# Patient Record
Sex: MALE | Race: WHITE | Employment: FULL TIME | URBAN - METROPOLITAN AREA
[De-identification: names, ages, dates, MRNs, and addresses within clinical notes are randomized per-mention and may not be internally consistent; named-entity substitution may affect disease eponyms.]

---

## 2017-02-14 ENCOUNTER — TELEPHONE (OUTPATIENT)
Dept: FAMILY MEDICINE CLINIC | Age: 22
End: 2017-02-14

## 2017-02-15 ENCOUNTER — TELEPHONE (OUTPATIENT)
Dept: FAMILY MEDICINE CLINIC | Age: 22
End: 2017-02-15

## 2017-04-27 ENCOUNTER — TELEPHONE (OUTPATIENT)
Dept: FAMILY MEDICINE CLINIC | Age: 22
End: 2017-04-27

## 2017-05-13 ENCOUNTER — OFFICE VISIT (OUTPATIENT)
Dept: FAMILY MEDICINE CLINIC | Age: 22
End: 2017-05-13

## 2017-05-13 VITALS
DIASTOLIC BLOOD PRESSURE: 62 MMHG | OXYGEN SATURATION: 98 % | WEIGHT: 194.2 LBS | HEIGHT: 72 IN | SYSTOLIC BLOOD PRESSURE: 116 MMHG | HEART RATE: 54 BPM | BODY MASS INDEX: 26.3 KG/M2

## 2017-05-13 DIAGNOSIS — L70.0 ACNE VULGARIS: Primary | ICD-10-CM

## 2017-05-13 PROCEDURE — 99213 OFFICE O/P EST LOW 20 MIN: CPT | Performed by: FAMILY MEDICINE

## 2017-05-13 RX ORDER — CLINDAMYCIN PHOSPHATE 10 UG/ML
LOTION TOPICAL
Qty: 60 G | Refills: 2 | Status: SHIPPED | OUTPATIENT
Start: 2017-05-13 | End: 2018-08-03

## 2017-05-13 ASSESSMENT — ENCOUNTER SYMPTOMS
ABDOMINAL PAIN: 0
DIARRHEA: 0
SORE THROAT: 0
SHORTNESS OF BREATH: 0
CONSTIPATION: 0
BLOOD IN STOOL: 0

## 2017-05-19 ENCOUNTER — TELEPHONE (OUTPATIENT)
Dept: FAMILY MEDICINE CLINIC | Age: 22
End: 2017-05-19

## 2017-05-25 ENCOUNTER — OFFICE VISIT (OUTPATIENT)
Dept: FAMILY MEDICINE CLINIC | Age: 22
End: 2017-05-25

## 2017-05-25 VITALS
HEART RATE: 65 BPM | OXYGEN SATURATION: 98 % | DIASTOLIC BLOOD PRESSURE: 74 MMHG | HEIGHT: 72 IN | BODY MASS INDEX: 26.55 KG/M2 | SYSTOLIC BLOOD PRESSURE: 122 MMHG | WEIGHT: 196 LBS

## 2017-05-25 DIAGNOSIS — D22.9 ATYPICAL NEVUS: ICD-10-CM

## 2017-05-25 DIAGNOSIS — L70.0 ACNE VULGARIS: Primary | ICD-10-CM

## 2017-05-25 DIAGNOSIS — B07.9 VIRAL WARTS, UNSPECIFIED TYPE: ICD-10-CM

## 2017-05-25 PROCEDURE — 99213 OFFICE O/P EST LOW 20 MIN: CPT | Performed by: FAMILY MEDICINE

## 2017-05-25 PROCEDURE — 17110 DESTRUCTION B9 LES UP TO 14: CPT | Performed by: FAMILY MEDICINE

## 2017-05-25 ASSESSMENT — ENCOUNTER SYMPTOMS
DIARRHEA: 0
ABDOMINAL PAIN: 0
CONSTIPATION: 0
SORE THROAT: 0
SHORTNESS OF BREATH: 0
BLOOD IN STOOL: 0

## 2017-06-01 PROBLEM — D22.9 ATYPICAL NEVUS: Status: ACTIVE | Noted: 2017-05-01

## 2017-06-10 ENCOUNTER — OFFICE VISIT (OUTPATIENT)
Dept: FAMILY MEDICINE CLINIC | Age: 22
End: 2017-06-10

## 2017-06-10 VITALS
HEART RATE: 64 BPM | WEIGHT: 192 LBS | HEIGHT: 72 IN | DIASTOLIC BLOOD PRESSURE: 68 MMHG | SYSTOLIC BLOOD PRESSURE: 118 MMHG | OXYGEN SATURATION: 98 % | BODY MASS INDEX: 26.01 KG/M2

## 2017-06-10 DIAGNOSIS — B07.9 VIRAL WARTS, UNSPECIFIED TYPE: ICD-10-CM

## 2017-06-10 DIAGNOSIS — L70.0 ACNE VULGARIS: Primary | ICD-10-CM

## 2017-06-10 DIAGNOSIS — D22.9 ATYPICAL NEVUS: ICD-10-CM

## 2017-06-10 DIAGNOSIS — Z87.39 HISTORY OF RHABDOMYOLYSIS: ICD-10-CM

## 2017-06-10 LAB — TOTAL CK: 434 U/L (ref 0–190)

## 2017-06-10 PROCEDURE — 99213 OFFICE O/P EST LOW 20 MIN: CPT | Performed by: FAMILY MEDICINE

## 2017-06-10 PROCEDURE — 17110 DESTRUCTION B9 LES UP TO 14: CPT | Performed by: FAMILY MEDICINE

## 2017-06-10 ASSESSMENT — ENCOUNTER SYMPTOMS
SORE THROAT: 0
DIARRHEA: 0
SHORTNESS OF BREATH: 0
BLOOD IN STOOL: 0
CONSTIPATION: 0
ABDOMINAL PAIN: 0

## 2017-06-13 DIAGNOSIS — R74.8 ELEVATED CK: Primary | ICD-10-CM

## 2017-06-15 LAB
% CKMB: 0 % (ref 0–4)
CK-BB: 0 % (ref 0–0)
CK-MACRO TYPE I: 0 % (ref 0–0)
CK-MACRO TYPE II: 0 % (ref 0–0)
CK-MM: 100 % (ref 96–100)
TOTAL CK: 440 U/L (ref 20–200)

## 2017-06-22 ENCOUNTER — OFFICE VISIT (OUTPATIENT)
Dept: FAMILY MEDICINE CLINIC | Age: 22
End: 2017-06-22

## 2017-06-22 VITALS
WEIGHT: 194 LBS | OXYGEN SATURATION: 98 % | HEIGHT: 72 IN | BODY MASS INDEX: 26.28 KG/M2 | HEART RATE: 64 BPM | SYSTOLIC BLOOD PRESSURE: 102 MMHG | DIASTOLIC BLOOD PRESSURE: 54 MMHG

## 2017-06-22 DIAGNOSIS — B07.9 VIRAL WARTS, UNSPECIFIED TYPE: ICD-10-CM

## 2017-06-22 DIAGNOSIS — L70.0 ACNE VULGARIS: Primary | ICD-10-CM

## 2017-06-22 PROCEDURE — 99213 OFFICE O/P EST LOW 20 MIN: CPT | Performed by: FAMILY MEDICINE

## 2017-06-22 RX ORDER — AMOXICILLIN 250 MG/1
250 CAPSULE ORAL 2 TIMES DAILY
Qty: 60 CAPSULE | Refills: 2 | Status: SHIPPED | OUTPATIENT
Start: 2017-06-22 | End: 2018-08-03

## 2017-06-22 ASSESSMENT — ENCOUNTER SYMPTOMS
DIARRHEA: 0
ABDOMINAL PAIN: 0
SHORTNESS OF BREATH: 0
CONSTIPATION: 0
BLOOD IN STOOL: 0
SORE THROAT: 0

## 2017-07-08 ENCOUNTER — OFFICE VISIT (OUTPATIENT)
Dept: FAMILY MEDICINE CLINIC | Age: 22
End: 2017-07-08

## 2017-07-08 VITALS
DIASTOLIC BLOOD PRESSURE: 60 MMHG | WEIGHT: 187 LBS | HEIGHT: 72 IN | BODY MASS INDEX: 25.33 KG/M2 | SYSTOLIC BLOOD PRESSURE: 130 MMHG | HEART RATE: 73 BPM | OXYGEN SATURATION: 98 %

## 2017-07-08 DIAGNOSIS — L70.0 ACNE VULGARIS: Primary | ICD-10-CM

## 2017-07-08 PROCEDURE — 99213 OFFICE O/P EST LOW 20 MIN: CPT | Performed by: FAMILY MEDICINE

## 2017-07-08 RX ORDER — TRETINOIN 0.05 G/100G
GEL TOPICAL
Qty: 45 G | Refills: 5 | Status: SHIPPED | OUTPATIENT
Start: 2017-07-08 | End: 2018-08-03

## 2017-07-08 ASSESSMENT — ENCOUNTER SYMPTOMS
SHORTNESS OF BREATH: 0
BLOOD IN STOOL: 0
CONSTIPATION: 0
ABDOMINAL PAIN: 0
DIARRHEA: 0
SORE THROAT: 0

## 2017-07-08 ASSESSMENT — PATIENT HEALTH QUESTIONNAIRE - PHQ9
1. LITTLE INTEREST OR PLEASURE IN DOING THINGS: 0
2. FEELING DOWN, DEPRESSED OR HOPELESS: 0
SUM OF ALL RESPONSES TO PHQ9 QUESTIONS 1 & 2: 0
SUM OF ALL RESPONSES TO PHQ QUESTIONS 1-9: 0

## 2017-07-27 ENCOUNTER — OFFICE VISIT (OUTPATIENT)
Dept: FAMILY MEDICINE CLINIC | Age: 22
End: 2017-07-27

## 2017-07-27 VITALS
DIASTOLIC BLOOD PRESSURE: 68 MMHG | HEART RATE: 58 BPM | WEIGHT: 186 LBS | BODY MASS INDEX: 25.19 KG/M2 | SYSTOLIC BLOOD PRESSURE: 124 MMHG | OXYGEN SATURATION: 98 % | HEIGHT: 72 IN

## 2017-07-27 DIAGNOSIS — L70.0 ACNE VULGARIS: Primary | ICD-10-CM

## 2017-07-27 PROCEDURE — 99213 OFFICE O/P EST LOW 20 MIN: CPT | Performed by: FAMILY MEDICINE

## 2017-07-27 ASSESSMENT — ENCOUNTER SYMPTOMS
ABDOMINAL PAIN: 0
BLOOD IN STOOL: 0
DIARRHEA: 0
SORE THROAT: 0
SHORTNESS OF BREATH: 0
CONSTIPATION: 0

## 2017-08-10 ENCOUNTER — OFFICE VISIT (OUTPATIENT)
Dept: FAMILY MEDICINE CLINIC | Age: 22
End: 2017-08-10

## 2017-08-10 VITALS
BODY MASS INDEX: 24.79 KG/M2 | WEIGHT: 183 LBS | HEART RATE: 62 BPM | HEIGHT: 72 IN | SYSTOLIC BLOOD PRESSURE: 118 MMHG | DIASTOLIC BLOOD PRESSURE: 68 MMHG | OXYGEN SATURATION: 98 %

## 2017-08-10 DIAGNOSIS — L70.0 ACNE VULGARIS: Primary | ICD-10-CM

## 2017-08-10 PROCEDURE — 99213 OFFICE O/P EST LOW 20 MIN: CPT | Performed by: FAMILY MEDICINE

## 2018-03-08 ENCOUNTER — OFFICE VISIT (OUTPATIENT)
Dept: FAMILY MEDICINE CLINIC | Age: 23
End: 2018-03-08
Payer: COMMERCIAL

## 2018-03-08 VITALS
OXYGEN SATURATION: 99 % | WEIGHT: 186 LBS | BODY MASS INDEX: 25.19 KG/M2 | HEART RATE: 68 BPM | HEIGHT: 72 IN | SYSTOLIC BLOOD PRESSURE: 118 MMHG | DIASTOLIC BLOOD PRESSURE: 68 MMHG

## 2018-03-08 DIAGNOSIS — L29.9 PRURITUS OF SKIN: ICD-10-CM

## 2018-03-08 DIAGNOSIS — B07.9 VIRAL WARTS, UNSPECIFIED TYPE: Primary | ICD-10-CM

## 2018-03-08 PROCEDURE — 17110 DESTRUCTION B9 LES UP TO 14: CPT | Performed by: FAMILY MEDICINE

## 2018-03-08 ASSESSMENT — ENCOUNTER SYMPTOMS
ABDOMINAL PAIN: 0
SHORTNESS OF BREATH: 0

## 2018-08-03 ENCOUNTER — OFFICE VISIT (OUTPATIENT)
Dept: PRIMARY CARE CLINIC | Age: 23
End: 2018-08-03
Payer: COMMERCIAL

## 2018-08-03 ENCOUNTER — HOSPITAL ENCOUNTER (EMERGENCY)
Age: 23
Discharge: HOME | End: 2018-08-03
Attending: EMERGENCY MEDICINE
Payer: COMMERCIAL

## 2018-08-03 VITALS
SYSTOLIC BLOOD PRESSURE: 122 MMHG | DIASTOLIC BLOOD PRESSURE: 78 MMHG | TEMPERATURE: 98.4 F | OXYGEN SATURATION: 98 % | HEART RATE: 65 BPM | WEIGHT: 170 LBS | BODY MASS INDEX: 23.03 KG/M2 | RESPIRATION RATE: 15 BRPM | HEIGHT: 72 IN

## 2018-08-03 VITALS
DIASTOLIC BLOOD PRESSURE: 88 MMHG | RESPIRATION RATE: 16 BRPM | SYSTOLIC BLOOD PRESSURE: 128 MMHG | TEMPERATURE: 98.1 F | WEIGHT: 171 LBS | OXYGEN SATURATION: 98 % | HEIGHT: 72 IN | HEART RATE: 79 BPM | BODY MASS INDEX: 23.16 KG/M2

## 2018-08-03 DIAGNOSIS — R81 GLUCOSURIA: ICD-10-CM

## 2018-08-03 DIAGNOSIS — R63.1 ALWAYS THIRSTY: ICD-10-CM

## 2018-08-03 DIAGNOSIS — R35.0 FREQUENT URINATION: Primary | ICD-10-CM

## 2018-08-03 DIAGNOSIS — R73.09 ELEVATED HEMOGLOBIN A1C: ICD-10-CM

## 2018-08-03 DIAGNOSIS — E11.9 DIABETES MELLITUS, NEW ONSET (HCC): Primary | ICD-10-CM

## 2018-08-03 LAB
ALBUMIN SERPL-MCNC: 4.8 G/DL (ref 3.9–4.9)
ALP BLD-CCNC: 122 U/L (ref 35–104)
ALT SERPL-CCNC: 44 U/L (ref 0–41)
ANION GAP SERPL CALCULATED.3IONS-SCNC: 15 MEQ/L (ref 7–13)
AST SERPL-CCNC: 29 U/L (ref 0–40)
BETA-HYDROXYBUTYRATE: 1.9 MG/DL (ref 0.2–2.8)
BILIRUB SERPL-MCNC: 0.6 MG/DL (ref 0–1.2)
BILIRUBIN URINE: NEGATIVE
BILIRUBIN, POC: NORMAL
BLOOD URINE, POC: NORMAL
BLOOD, URINE: NEGATIVE
BUN BLDV-MCNC: 21 MG/DL (ref 6–20)
CALCIUM SERPL-MCNC: 9 MG/DL (ref 8.6–10.2)
CHLORIDE BLD-SCNC: 93 MEQ/L (ref 98–107)
CHP ED QC CHECK: NORMAL
CLARITY, POC: CLEAR
CLARITY: CLEAR
CO2: 23 MEQ/L (ref 22–29)
COLOR, POC: YELLOW
COLOR: YELLOW
CREAT SERPL-MCNC: 0.97 MG/DL (ref 0.7–1.2)
GFR AFRICAN AMERICAN: >60
GFR NON-AFRICAN AMERICAN: >60
GLOBULIN: 2.5 G/DL (ref 2.3–3.5)
GLUCOSE BLD-MCNC: 352 MG/DL (ref 60–115)
GLUCOSE BLD-MCNC: 357 MG/DL
GLUCOSE BLD-MCNC: 357 MG/DL (ref 60–115)
GLUCOSE BLD-MCNC: 493 MG/DL (ref 74–109)
GLUCOSE BLD-MCNC: 508 MG/DL (ref 60–115)
GLUCOSE BLD-MCNC: NORMAL MG/DL
GLUCOSE URINE, POC: NORMAL
GLUCOSE URINE: >=1000 MG/DL
HBA1C MFR BLD: 11 %
HCT VFR BLD CALC: 44.9 % (ref 42–52)
HEMOGLOBIN: 15.7 G/DL (ref 14–18)
KETONES, POC: NORMAL
KETONES, URINE: 15 MG/DL
LEUKOCYTE EST, POC: NORMAL
LEUKOCYTE ESTERASE, URINE: NEGATIVE
MCH RBC QN AUTO: 32.5 PG (ref 27–31.3)
MCHC RBC AUTO-ENTMCNC: 35 % (ref 33–37)
MCV RBC AUTO: 92.9 FL (ref 80–100)
NITRITE, POC: NORMAL
NITRITE, URINE: NEGATIVE
PDW BLD-RTO: 13.3 % (ref 11.5–14.5)
PERFORMED ON: ABNORMAL
PH UA: 5.5 (ref 5–9)
PH, POC: 6
PLATELET # BLD: 200 K/UL (ref 130–400)
POTASSIUM SERPL-SCNC: 3.9 MEQ/L (ref 3.5–5.1)
PROTEIN UA: NEGATIVE MG/DL
PROTEIN, POC: NORMAL
RBC # BLD: 4.84 M/UL (ref 4.7–6.1)
SODIUM BLD-SCNC: 131 MEQ/L (ref 132–144)
SPECIFIC GRAVITY UA: 1.03 (ref 1–1.03)
SPECIFIC GRAVITY, POC: 1.01
TOTAL PROTEIN: 7.3 G/DL (ref 6.4–8.1)
URINE REFLEX TO CULTURE: ABNORMAL
UROBILINOGEN, POC: NORMAL
UROBILINOGEN, URINE: 0.2 E.U./DL
WBC # BLD: 7.6 K/UL (ref 4.5–11)

## 2018-08-03 PROCEDURE — 6370000000 HC RX 637 (ALT 250 FOR IP): Performed by: EMERGENCY MEDICINE

## 2018-08-03 PROCEDURE — 82962 GLUCOSE BLOOD TEST: CPT | Performed by: PHYSICIAN ASSISTANT

## 2018-08-03 PROCEDURE — 99283 EMERGENCY DEPT VISIT LOW MDM: CPT

## 2018-08-03 PROCEDURE — 80053 COMPREHEN METABOLIC PANEL: CPT

## 2018-08-03 PROCEDURE — 82010 KETONE BODYS QUAN: CPT

## 2018-08-03 PROCEDURE — 81002 URINALYSIS NONAUTO W/O SCOPE: CPT | Performed by: PHYSICIAN ASSISTANT

## 2018-08-03 PROCEDURE — 96372 THER/PROPH/DIAG INJ SC/IM: CPT

## 2018-08-03 PROCEDURE — 83036 HEMOGLOBIN GLYCOSYLATED A1C: CPT | Performed by: PHYSICIAN ASSISTANT

## 2018-08-03 PROCEDURE — 2580000003 HC RX 258: Performed by: EMERGENCY MEDICINE

## 2018-08-03 PROCEDURE — 99213 OFFICE O/P EST LOW 20 MIN: CPT | Performed by: PHYSICIAN ASSISTANT

## 2018-08-03 PROCEDURE — 85027 COMPLETE CBC AUTOMATED: CPT

## 2018-08-03 PROCEDURE — 81003 URINALYSIS AUTO W/O SCOPE: CPT

## 2018-08-03 PROCEDURE — 36415 COLL VENOUS BLD VENIPUNCTURE: CPT

## 2018-08-03 RX ORDER — INSULIN GLARGINE 100 [IU]/ML
10 INJECTION, SOLUTION SUBCUTANEOUS ONCE
Status: COMPLETED | OUTPATIENT
Start: 2018-08-03 | End: 2018-08-03

## 2018-08-03 RX ORDER — 0.9 % SODIUM CHLORIDE 0.9 %
2000 INTRAVENOUS SOLUTION INTRAVENOUS ONCE
Status: COMPLETED | OUTPATIENT
Start: 2018-08-03 | End: 2018-08-03

## 2018-08-03 RX ADMIN — SODIUM CHLORIDE 1000 ML: 9 INJECTION, SOLUTION INTRAVENOUS at 18:36

## 2018-08-03 RX ADMIN — INSULIN HUMAN 5 UNITS: 100 INJECTION, SOLUTION PARENTERAL at 20:26

## 2018-08-03 RX ADMIN — INSULIN GLARGINE 10 UNITS: 100 INJECTION, SOLUTION SUBCUTANEOUS at 20:26

## 2018-08-03 ASSESSMENT — ENCOUNTER SYMPTOMS
FACIAL SWELLING: 0
EYE DISCHARGE: 0
COUGH: 0
WHEEZING: 0
SHORTNESS OF BREATH: 0
ABDOMINAL PAIN: 0
RHINORRHEA: 0
VOMITING: 0
SHORTNESS OF BREATH: 0
COLOR CHANGE: 0
COUGH: 1
WHEEZING: 0
STRIDOR: 0

## 2018-08-03 ASSESSMENT — PATIENT HEALTH QUESTIONNAIRE - PHQ9
SUM OF ALL RESPONSES TO PHQ9 QUESTIONS 1 & 2: 0
SUM OF ALL RESPONSES TO PHQ QUESTIONS 1-9: 0
2. FEELING DOWN, DEPRESSED OR HOPELESS: 0
1. LITTLE INTEREST OR PLEASURE IN DOING THINGS: 0

## 2018-08-03 NOTE — ED NOTES
poc glucose 1 [facility]  08/03/18 [address] Henri[address]bour [address], RN  08/03/18 6050

## 2018-08-03 NOTE — PROGRESS NOTES
and polyuria. Negative for cold intolerance, heat intolerance and polyphagia. Objective    Vitals:    08/03/18 1606   BP: 128/88   Pulse: 79   Resp: 16   Temp: 98.1 °F (36.7 °C)   TempSrc: Tympanic   SpO2: 98%   Weight: 171 lb (77.6 kg)   Height: 6' (1.829 m)       Physical Exam   Constitutional: He appears well-developed and well-nourished. No distress. HENT:   Head: Normocephalic and atraumatic. Right Ear: External ear normal.   Left Ear: External ear normal.   Mouth/Throat: Oropharynx is clear and moist. No oropharyngeal exudate. Eyes: Conjunctivae are normal. Right eye exhibits no discharge. Left eye exhibits no discharge. Cardiovascular: Normal rate, regular rhythm and normal heart sounds. Pulmonary/Chest: Effort normal and breath sounds normal.   Lymphadenopathy:     He has no cervical adenopathy. Skin: No rash noted. He is not diaphoretic. No erythema. No pallor. Vitals reviewed. Assessment and Plan      ICD-10-CM ICD-9-CM    1. Frequent urination R35.0 788.41 POCT Urinalysis no Micro   2. Always thirsty R63.1 783.5 POCT glycosylated hemoglobin (Hb A1C)      POCT Glucose   3. Elevated hemoglobin A1c R73.09 790.29    4. Glucosuria R81 791.5        Orders Placed This Encounter   Procedures    POCT Urinalysis no Micro    POCT glycosylated hemoglobin (Hb A1C)    POCT Glucose     Results for POC orders placed in visit on 08/03/18   POCT glycosylated hemoglobin (Hb A1C)   Result Value Ref Range    Hemoglobin A1C 11 %   POCT Glucose   Result Value Ref Range    Glucose  mg/dL   POCT Urinalysis no Micro   Result Value Ref Range    Color, UA yellow     Clarity, UA clear     Glucose, UA POC 3+     Bilirubin, UA -     Ketones, UA +-     Spec Grav, UA 1.010     Blood, UA POC -     pH, UA 6.0     Protein, UA POC -     Urobilinogen, UA -     Leukocytes, UA -     Nitrite, UA -      Pt was advised to go to the ER for IV fluids and further evaluation and treatment of possible DKA.   Pt is stable now

## 2018-08-03 NOTE — ED PROVIDER NOTES
[address] [facility] ED  eMERGENCY dEPARTMENT eNCOUnter      Pt Name: Kiara Guevara  MRN: [id number]  [place] 1995  Date of evaluation: 8/3/2018  Provider: Lisa Stiles, [address] [place] [address]       Chief Complaint   Patient presents with    Polydipsia     for 2 weeks extreme thirst and increased urination, feeling off, went to express care accucheck \"high\"and  A1C 11,  No HX of diabetes. 20 pound weight loss in 3 months         HISTORY OF PRESENT ILLNESS   (Location/Symptom, Timing/Onset, Context/Setting, Quality, Duration, Modifying Factors, Severity)  Note limiting factors. Kiara Guevara is a 23 y.o. male who presents to the emergency department Complaining of 2 weeks of extreme thirst and increased urination, getting up in the middle of the night at least 4 times to P and feeling kind of out of it and tired. He presented to another facility with these complaints today and they measured his blood sugar which was greater than 500. He was sent to the emergency room. HPI    Nursing Notes were reviewed. REVIEW OF SYSTEMS    (2-9 systems for level 4, 10 or more for level 5)     Review of Systems   Constitutional: Positive for appetite change. Negative for activity change and fatigue. HENT: Negative for congestion, facial swelling and rhinorrhea. Eyes: Negative for discharge. Respiratory: Negative for cough, shortness of breath and wheezing. Cardiovascular: Negative for chest pain. Gastrointestinal: Negative for abdominal pain and vomiting. Endocrine: Positive for polydipsia and polyuria. Negative for cold intolerance. Musculoskeletal: Negative for arthralgias and myalgias. Skin: Negative for color change. Allergic/Immunologic: Negative for environmental allergies. Neurological: Positive for dizziness and weakness. Negative for light-headedness. Hematological: Negative for adenopathy. Psychiatric/Behavioral: Negative for behavioral problems.        Except as noted above the round, and reactive to light. Neck: Normal range of motion. Neck supple. Cardiovascular: Normal rate. Pulmonary/Chest: Effort normal.   Abdominal: Soft. Bowel sounds are normal.   Musculoskeletal: Normal range of motion. Neurological: He is alert and oriented to person, place, and time. He has normal reflexes. Skin: Skin is warm and dry. Psychiatric: He has a normal mood and affect.        DIAGNOSTIC RESULTS     EKG: All EKG's are interpreted by the Emergency Department Physician who either signs or Co-signs this chart in the absence of a cardiologist.        RADIOLOGY:   Non-plain film images such as CT, Ultrasound and MRI are read by the radiologist. Plain radiographic images are visualized and preliminarily interpreted by the emergency physician with the below findings:        Interpretation per the Radiologist below, if available at the time of this note:    No orders to display         ED BEDSIDE ULTRASOUND:   Performed by ED Physician - none    LABS:  Labs Reviewed   CBC - Abnormal; Notable for the following:        Result Value    MCH 32.5 (*)     All other components within normal limits   COMPREHENSIVE METABOLIC PANEL - Abnormal; Notable for the following:     Sodium 131 (*)     Chloride 93 (*)     Anion Gap 15 (*)     Glucose 493 (*)     BUN 21 (*)     Alkaline Phosphatase 122 (*)     ALT 44 (*)     All other components within normal limits    Narrative:     [id number]  Glucose results called to and read back by Ruth Oleary MD, 08/03/2018  19:25, by Pascual Burroughs   URINE RT REFLEX TO CULTURE - Abnormal; Notable for the following:     Glucose, Ur >=1000 (*)     Ketones, Urine 15 (*)     All other components within normal limits   POCT GLUCOSE - Abnormal; Notable for the following:     POC Glucose 508 (*)     All other components within normal limits   POCT GLUCOSE - Normal   BETA-HYDROXYBUTYRATE   POC GLUCOSE FINGERSTICK       All other labs were within normal range or not

## 2018-08-04 ENCOUNTER — OFFICE VISIT (OUTPATIENT)
Dept: PRIMARY CARE CLINIC | Age: 23
End: 2018-08-04
Payer: COMMERCIAL

## 2018-08-04 VITALS
DIASTOLIC BLOOD PRESSURE: 64 MMHG | WEIGHT: 169.4 LBS | RESPIRATION RATE: 16 BRPM | TEMPERATURE: 96.5 F | SYSTOLIC BLOOD PRESSURE: 98 MMHG | HEIGHT: 72 IN | BODY MASS INDEX: 22.94 KG/M2 | OXYGEN SATURATION: 97 % | HEART RATE: 49 BPM

## 2018-08-04 DIAGNOSIS — R73.9 HYPERGLYCEMIA: Primary | ICD-10-CM

## 2018-08-04 LAB — GLUCOSE BLD-MCNC: 279 MG/DL

## 2018-08-04 PROCEDURE — 82962 GLUCOSE BLOOD TEST: CPT | Performed by: INTERNAL MEDICINE

## 2018-08-04 PROCEDURE — 99213 OFFICE O/P EST LOW 20 MIN: CPT | Performed by: INTERNAL MEDICINE

## 2018-08-04 RX ORDER — GLIMEPIRIDE 2 MG/1
2 TABLET ORAL EVERY MORNING
Qty: 30 TABLET | Refills: 2 | Status: SHIPPED | OUTPATIENT
Start: 2018-08-04 | End: 2018-08-10 | Stop reason: ALTCHOICE

## 2018-08-04 NOTE — ED NOTES
OT recheck 352. Patient given diabetes education and discharge instructions. Patient denies questions or concerns. Patient is to follow up with Dr Yesenia Servin in am. Patient ambulates out of ER with steady gait.       Mary Leos RN  08/03/18 9707

## 2018-08-08 ASSESSMENT — ENCOUNTER SYMPTOMS
CHOKING: 0
VOMITING: 0
TROUBLE SWALLOWING: 0
NAUSEA: 0
PHOTOPHOBIA: 0
VOICE CHANGE: 0
SHORTNESS OF BREATH: 0

## 2018-08-10 ENCOUNTER — OFFICE VISIT (OUTPATIENT)
Dept: ENDOCRINOLOGY | Age: 23
End: 2018-08-10
Payer: COMMERCIAL

## 2018-08-10 VITALS
HEIGHT: 72 IN | HEART RATE: 68 BPM | SYSTOLIC BLOOD PRESSURE: 126 MMHG | BODY MASS INDEX: 22.89 KG/M2 | WEIGHT: 169 LBS | DIASTOLIC BLOOD PRESSURE: 72 MMHG

## 2018-08-10 DIAGNOSIS — R63.4 WEIGHT LOSS: Primary | ICD-10-CM

## 2018-08-10 DIAGNOSIS — E11.8 UNCONTROLLED TYPE 2 DIABETES MELLITUS WITH COMPLICATION, UNSPECIFIED WHETHER LONG TERM INSULIN USE: ICD-10-CM

## 2018-08-10 DIAGNOSIS — E11.65 UNCONTROLLED TYPE 2 DIABETES MELLITUS WITH COMPLICATION, UNSPECIFIED WHETHER LONG TERM INSULIN USE: ICD-10-CM

## 2018-08-10 LAB
ANION GAP SERPL CALCULATED.3IONS-SCNC: 12 MEQ/L (ref 7–13)
BUN BLDV-MCNC: 21 MG/DL (ref 6–20)
CALCIUM SERPL-MCNC: 9.1 MG/DL (ref 8.6–10.2)
CHLORIDE BLD-SCNC: 96 MEQ/L (ref 98–107)
CO2: 27 MEQ/L (ref 22–29)
CREAT SERPL-MCNC: 1.04 MG/DL (ref 0.7–1.2)
CREATININE URINE: 144.2 MG/DL
GFR AFRICAN AMERICAN: >60
GFR NON-AFRICAN AMERICAN: >60
GLUCOSE BLD-MCNC: 249 MG/DL
GLUCOSE BLD-MCNC: 266 MG/DL (ref 74–109)
MICROALBUMIN UR-MCNC: <1.2 MG/DL
MICROALBUMIN/CREAT UR-RTO: NORMAL MG/G (ref 0–30)
POTASSIUM SERPL-SCNC: 4.4 MEQ/L (ref 3.5–5.1)
SODIUM BLD-SCNC: 135 MEQ/L (ref 132–144)

## 2018-08-10 PROCEDURE — 99243 OFF/OP CNSLTJ NEW/EST LOW 30: CPT | Performed by: INTERNAL MEDICINE

## 2018-08-10 PROCEDURE — 95250 CONT GLUC MNTR PHYS/QHP EQP: CPT | Performed by: INTERNAL MEDICINE

## 2018-08-10 RX ORDER — INSULIN LISPRO 100 [IU]/ML
INJECTION, SUSPENSION SUBCUTANEOUS
Qty: 10 PEN | Refills: 3 | Status: SHIPPED | OUTPATIENT
Start: 2018-08-10 | End: 2018-09-05 | Stop reason: SDUPTHER

## 2018-08-14 ENCOUNTER — OFFICE VISIT (OUTPATIENT)
Dept: ENDOCRINOLOGY | Age: 23
End: 2018-08-14
Payer: COMMERCIAL

## 2018-08-14 DIAGNOSIS — E11.65 UNCONTROLLED TYPE 2 DIABETES MELLITUS WITH COMPLICATION, UNSPECIFIED WHETHER LONG TERM INSULIN USE (HCC): Primary | ICD-10-CM

## 2018-08-14 DIAGNOSIS — E11.8 UNCONTROLLED TYPE 2 DIABETES MELLITUS WITH COMPLICATION, UNSPECIFIED WHETHER LONG TERM INSULIN USE (HCC): Primary | ICD-10-CM

## 2018-08-14 LAB — C-PEPTIDE: 1.6 NG/ML (ref 1.1–4.4)

## 2018-08-14 PROCEDURE — 95251 CONT GLUC MNTR ANALYSIS I&R: CPT | Performed by: INTERNAL MEDICINE

## 2018-08-14 PROCEDURE — 99999 PR OFFICE/OUTPT VISIT,PROCEDURE ONLY: CPT | Performed by: INTERNAL MEDICINE

## 2018-08-17 ASSESSMENT — ENCOUNTER SYMPTOMS: VISUAL CHANGE: 1
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
## 2018-08-29 ENCOUNTER — TELEPHONE (OUTPATIENT)
Dept: ENDOCRINOLOGY | Age: 23
End: 2018-08-29

## 2018-08-29 ENCOUNTER — HOSPITAL ENCOUNTER (OUTPATIENT)
Dept: DIABETES SERVICES | Age: 23
Setting detail: THERAPIES SERIES
Discharge: HOME | End: 2018-08-29
Payer: COMMERCIAL

## 2018-08-29 VITALS — WEIGHT: 184 LBS | BODY MASS INDEX: 24.92 KG/M2 | HEIGHT: 72 IN

## 2018-08-29 PROCEDURE — G0108 DIAB MANAGE TRN  PER INDIV: HCPCS

## 2018-08-29 RX ORDER — LANCETS 33 GAUGE
EACH MISCELLANEOUS
Qty: 400 EACH | Refills: 3 | Status: SHIPPED | OUTPATIENT
Start: 2018-08-29 | End: 2019-03-20 | Stop reason: SDUPTHER

## 2018-08-29 RX ORDER — BLOOD-GLUCOSE METER
EACH MISCELLANEOUS
Qty: 1 DEVICE | Refills: 0 | Status: SHIPPED | OUTPATIENT
Start: 2018-08-29 | End: 2018-08-29 | Stop reason: SDUPTHER

## 2018-08-29 RX ORDER — LANCETS 33 GAUGE
EACH MISCELLANEOUS
Qty: 400 EACH | Refills: 3 | Status: SHIPPED | OUTPATIENT
Start: 2018-08-29 | End: 2018-08-29 | Stop reason: SDUPTHER

## 2018-08-29 RX ORDER — BLOOD-GLUCOSE METER
EACH MISCELLANEOUS
Qty: 1 DEVICE | Refills: 0 | Status: SHIPPED | OUTPATIENT
Start: 2018-08-29 | End: 2019-03-20 | Stop reason: SDUPTHER

## 2018-08-29 ASSESSMENT — SLEEP AND FATIGUE QUESTIONNAIRES
HOW DO YOU RATE THE QUALITY OF YOUR SLEEP: FAIR
HAVE YOU BEEN TOLD, OR NOTICED ON YOUR OWN, THAT YOU STOP BREATHING OR STRUGGLE TO BREATHE IN YOUR SLEEP: NO
HAVE YOU EVER BEEN TESTED FOR SLEEP APNEA: NO
HOW MANY HOURS OF SLEEP ARE YOU GETTING, ON AVERAGE: 7 OR MORE

## 2018-08-29 ASSESSMENT — PATIENT HEALTH QUESTIONNAIRE - PHQ9
SUM OF ALL RESPONSES TO PHQ QUESTIONS 1-9: 0
SUM OF ALL RESPONSES TO PHQ QUESTIONS 1-9: 0

## 2018-08-29 ASSESSMENT — PROBLEM AREAS IN DIABETES QUESTIONNAIRE (PAID)
FEELING DEPRESSED WHEN YOU THINK ABOUT LIVING WITH DIABETES: 0
COPING WITH COMPLICATIONS OF DIABETES: 0
WORRYING ABOUT THE FUTURE AND THE POSSIBILITY OF SERIOUS COMPLICATIONS: 0
FEELING THAT DIABETES IS TAKING UP TOO MUCH OF YOUR MENTAL AND PHYSICAL ENERGY EVERY DAY: 0

## 2018-08-29 NOTE — TELEPHONE ENCOUNTER
Patient stops in today to request diabetic supplies. He is requesting the brand AGAMATRIX monitor, strips and lancets be sent to [facility]. Please advise.

## 2018-08-29 NOTE — PROGRESS NOTES
Type 2 Diabetes ADA home support program     [] [facility] [phone]     [] Fit Walks : [facility] or [facility]    []  Diabetes support Group      []   Emotional Support      [] Vani on phone      []  Internet web sites - ADA and D- life    []  Journals/Magazines    []  Other ___________________________      Post Education Referrals:      [] [address] [facility] information sheet and [address] [facility] , 21 [phone]      [] Dental care    [] Podiatrist     []  Opthamologist      []Other    Joslyn Varela RN

## 2018-09-04 ENCOUNTER — HOSPITAL ENCOUNTER (OUTPATIENT)
Dept: DIABETES SERVICES | Age: 23
Setting detail: THERAPIES SERIES
Discharge: HOME | End: 2018-09-04
Payer: COMMERCIAL

## 2018-09-04 PROCEDURE — G0109 DIAB MANAGE TRN IND/GROUP: HCPCS

## 2018-09-05 ENCOUNTER — OFFICE VISIT (OUTPATIENT)
Dept: ENDOCRINOLOGY | Age: 23
End: 2018-09-05
Payer: COMMERCIAL

## 2018-09-05 VITALS
BODY MASS INDEX: 25.73 KG/M2 | WEIGHT: 190 LBS | SYSTOLIC BLOOD PRESSURE: 104 MMHG | DIASTOLIC BLOOD PRESSURE: 64 MMHG | HEART RATE: 63 BPM | HEIGHT: 72 IN

## 2018-09-05 LAB
ANION GAP SERPL CALCULATED.3IONS-SCNC: 12 MEQ/L (ref 7–13)
BUN BLDV-MCNC: 19 MG/DL (ref 6–20)
CALCIUM SERPL-MCNC: 9.2 MG/DL (ref 8.6–10.2)
CHLORIDE BLD-SCNC: 101 MEQ/L (ref 98–107)
CO2: 28 MEQ/L (ref 22–29)
CREAT SERPL-MCNC: 1.13 MG/DL (ref 0.7–1.2)
GFR AFRICAN AMERICAN: >60
GFR NON-AFRICAN AMERICAN: >60
GLUCOSE BLD-MCNC: 119 MG/DL
GLUCOSE BLD-MCNC: 88 MG/DL (ref 74–109)
POTASSIUM SERPL-SCNC: 4.3 MEQ/L (ref 3.5–5.1)
SODIUM BLD-SCNC: 141 MEQ/L (ref 132–144)

## 2018-09-05 PROCEDURE — 99214 OFFICE O/P EST MOD 30 MIN: CPT | Performed by: INTERNAL MEDICINE

## 2018-09-05 PROCEDURE — 82962 GLUCOSE BLOOD TEST: CPT | Performed by: INTERNAL MEDICINE

## 2018-09-05 RX ORDER — INSULIN LISPRO 100 [IU]/ML
INJECTION, SUSPENSION SUBCUTANEOUS
Qty: 10 PEN | Refills: 3 | Status: SHIPPED | OUTPATIENT
Start: 2018-09-05 | End: 2018-09-06 | Stop reason: CLARIF
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
## 2018-09-05 NOTE — PROGRESS NOTES
Subjective:      Patient ID: Alexa Sarmiento is a 23 y.o. male. 4 week follow-up  Diabetes   He presents for his follow-up diabetic visit. He has type 2 diabetes mellitus. His disease course has been improving. Hypoglycemia symptoms include confusion and dizziness. Pertinent negatives for diabetes include no polydipsia, no polyuria and no weight loss. There are no diabetic complications. Risk factors for coronary artery disease include male sex and diabetes mellitus. Current diabetic treatment includes insulin injections (7525 Humalog plus metformin). He is compliant with treatment all of the time. He is currently taking insulin pre-breakfast and pre-dinner. (Reviewed two-week glucose monitoring average glucose was 197 he had CGM only for 3 days  Blood sugars were variable at this time they are more stable running between the 80s to 140-150   Had 1 episode of hypoglycemia when he took his insulin did not eat  Reviewed lab C-peptide was normal the making it less likely to have type 1 diabetes    Has gained all the weight he had lost)       Patient just had diabetes education done is also seeing dietitian today    Reviewed labs with patient    Results for Prince Williamson (MRN [id number]) as of 9/5/2018 13:36   Ref.  Range 8/10/2018 10:57 8/10/2018 10:58   Sodium Latest Ref Range: 132 - 144 mEq/L 135    Potassium Latest Ref Range: 3.5 - 5.1 mEq/L 4.4    Chloride Latest Ref Range: 98 - 107 mEq/L 96 (L)    CO2 Latest Ref Range: 22 - 29 mEq/L 27    BUN Latest Ref Range: 6 - 20 mg/dL 21 (H)    Creatinine Latest Ref Range: 0.70 - 1.20 mg/dL 1.04    Anion Gap Latest Ref Range: 7 - 13 mEq/L 12    GFR Non- Latest Ref Range: >60  >60.0    GFR African American Latest Ref Range: >60  >60.0    Glucose Latest Ref Range: 74 - 109 mg/dL 266 (H)    Calcium Latest Ref Range: 8.6 - 10.2 mg/dL 9.1    C-Peptide Latest Ref Range: 1.1 - 4.4 ng/mL 1.6    Creatinine, Ur Latest Ref Range: Not Established mg/dL  144.2   Microalbumin Creatinine Ratio Latest Ref Range: 0.0 - 30.0 mg/G  see below   Microalbumin, Random Urine Latest Ref Range: Not Established mg/dL  <1.20       Patient Active Problem List   Diagnosis    Acne    Elevated liver enzymes    Atypical nevus    Verruca vulgaris    Type II diabetes mellitus, uncontrolled (HCC)     No Known Allergies      Current Outpatient Prescriptions:     insulin lispro protamine & lispro (HUMALOG MIX 75/25 KWIKPEN) (75-25) 100 UNIT per ML SUPN injection pen, 12 units twice a day, Disp: 10 pen, Rfl: 3    Blood Glucose Monitoring Suppl (AGAMATRIX AMP) ANY, Please give 1 meter dx e10.65, Disp: 1 Device, Rfl: 0    blood glucose test strips (AGAMATRIX AMP TEST) strip, Pt test 4x daily dx e10.65, Disp: 400 each, Rfl: 3    AGAMATRIX ULTRA-THIN LANCETS MISC, Pt tests 4x daily dx e10.65, Disp: 400 each, Rfl: 3    Insulin Pen Needle (NOVOFINE) 32G X 6 MM MISC, Bid, Disp: 100 each, Rfl: 3    metFORMIN (GLUCOPHAGE) 500 MG tablet, Take 1 tablet by mouth 2 times daily (with meals), Disp: 60 tablet, Rfl: 3      Review of Systems   Constitutional: Negative for weight loss. Endocrine: Negative for polydipsia and polyuria. Neurological: Positive for dizziness. Psychiatric/Behavioral: Positive for confusion. Vitals:    09/05/18 1302   BP: 104/64   Site: Right Arm   Position: Sitting   Cuff Size: Large Adult   Pulse: 63   Weight: 190 lb (86.2 kg)   Height: 6' (1.829 m)       Objective:   Physical Exam   Constitutional: He appears well-developed and well-nourished. HENT:   Head: Normocephalic and atraumatic. Eyes: Conjunctivae are normal.   Neck: Neck supple. Cardiovascular: Normal rate. Pulmonary/Chest: Effort normal.   Musculoskeletal: Normal range of motion. Neurological: He is alert. Psychiatric: He has a normal mood and affect. Assessment:       Diagnosis Orders   1.  Uncontrolled type 2 diabetes mellitus with complication, with long-term current use of insulin ([address]  POCT

## 2018-09-06 ENCOUNTER — APPOINTMENT (OUTPATIENT)
Dept: DIABETES SERVICES | Age: 23
End: 2018-09-06
Payer: COMMERCIAL

## 2018-09-08 LAB — C-PEPTIDE: 1.2 NG/ML (ref 1.1–4.4)

## 2018-11-01 ENCOUNTER — OFFICE VISIT (OUTPATIENT)
Dept: ENDOCRINOLOGY | Age: 23
End: 2018-11-01
Payer: COMMERCIAL

## 2018-11-01 VITALS
BODY MASS INDEX: 25.36 KG/M2 | WEIGHT: 187 LBS | DIASTOLIC BLOOD PRESSURE: 72 MMHG | SYSTOLIC BLOOD PRESSURE: 115 MMHG | HEART RATE: 84 BPM | OXYGEN SATURATION: 96 %

## 2018-11-01 DIAGNOSIS — E11.65 UNCONTROLLED TYPE 2 DIABETES MELLITUS WITH HYPERGLYCEMIA (HCC): Primary | ICD-10-CM

## 2018-11-01 LAB
GLUCOSE BLD-MCNC: 111 MG/DL
HBA1C MFR BLD: 7.2 %

## 2018-11-01 PROCEDURE — 99213 OFFICE O/P EST LOW 20 MIN: CPT | Performed by: INTERNAL MEDICINE

## 2018-11-01 PROCEDURE — 82962 GLUCOSE BLOOD TEST: CPT | Performed by: INTERNAL MEDICINE

## 2018-11-01 PROCEDURE — 83036 HEMOGLOBIN GLYCOSYLATED A1C: CPT | Performed by: INTERNAL MEDICINE

## 2018-11-01 NOTE — PROGRESS NOTES
Subjective:      Patient ID: Zahraa Larkin is a 23 y.o. male. Three-month follow-up on type 2 diabetes  Diabetes   He presents for his follow-up diabetic visit. He has type 2 diabetes mellitus. His disease course has been improving. Hypoglycemia symptoms include confusion and mood changes. There are no hypoglycemic complications. Risk factors for coronary artery disease include diabetes mellitus. Current diabetic treatment includes insulin injections (NovoLog 7030+ metformin). His overall blood glucose range is 130-140 mg/dl. (Lab Results       Component                Value               Date                       LABA1C                   7.2                 11/01/2018            )     Patient has had occasional hypoglycemic episode when he has not had food for prolonged periods of time    Dinner diabetes education beginning of September at [address]    Patient Active Problem List   Diagnosis    Acne    Elevated liver enzymes    Atypical nevus    Verruca vulgaris    Type II diabetes mellitus, uncontrolled ([address]     No Known Allergies    Current Outpatient Prescriptions:     insulin aspart protamine-insulin aspart (NOVOLOG MIX 70/30 FLEXPEN) (70-30) 100 UNIT/ML injection, Inject 12 units bid, Disp: 5 pen, Rfl: 3    Blood Glucose Monitoring Suppl (AGAMATRIX AMP) ANY, Please give 1 meter dx e10.65, Disp: 1 Device, Rfl: 0    blood glucose test strips (AGAMATRIX AMP TEST) strip, Pt test 4x daily dx e10.65, Disp: 400 each, Rfl: 3    AGAMATRIX ULTRA-THIN LANCETS MISC, Pt tests 4x daily dx e10.65, Disp: 400 each, Rfl: 3    Insulin Pen Needle (NOVOFINE) 32G X 6 MM MISC, Bid, Disp: 100 each, Rfl: 3    metFORMIN (GLUCOPHAGE) 500 MG tablet, Take 1 tablet by mouth 2 times daily (with meals), Disp: 60 tablet, Rfl: 3        Review of Systems   Psychiatric/Behavioral: Positive for confusion.      Vitals:    11/01/18 1641   BP: 115/72   Site: Right Upper Arm   Position: Sitting   Cuff Size: Large Adult   Pulse: 84   SpO2: 96%   Weight: 187 lb (84.8 kg)       Objective:   Physical Exam   Constitutional: He appears well-developed and well-nourished. HENT:   Head: Atraumatic. Eyes: Conjunctivae are normal.   Cardiovascular: Normal rate. Pulmonary/Chest: Effort normal.   Musculoskeletal: Normal range of motion. Neurological: He is alert. Psychiatric: His mood appears anxious. Assessment:       Diagnosis Orders   1. Uncontrolled type 2 diabetes mellitus with hyperglycemia (HCC)  POCT glycosylated hemoglobin (Hb A1C)    POCT Glucose    Basic Metabolic Panel    Hemoglobin A1C           Plan:      Orders Placed This Encounter   Procedures    Basic Metabolic Panel     Standing Status:   Future     Standing Expiration Date:   11/1/2019    Hemoglobin A1C     Standing Status:   Future     Standing Expiration Date:   11/1/2019    POCT glycosylated hemoglobin (Hb A1C)    POCT Glucose     Diabetes education provided today:    Managing high and low sugar readings.     Patient educated extensively regarding hypoglycemia and always have something to eat when he is driving and depressive blood sugar before getting behind the abuse should be at least 120+  Continue with current dose of NovoLog 7030  12 units twice a day plus metformin 500 mg twice a day follow-up in 3 months time        Lorena Ureña MD

## 2019-02-06 ENCOUNTER — OFFICE VISIT (OUTPATIENT)
Dept: ENDOCRINOLOGY | Age: 24
End: 2019-02-06
Payer: COMMERCIAL

## 2019-02-06 VITALS
DIASTOLIC BLOOD PRESSURE: 73 MMHG | WEIGHT: 195 LBS | SYSTOLIC BLOOD PRESSURE: 119 MMHG | BODY MASS INDEX: 26.41 KG/M2 | OXYGEN SATURATION: 96 % | HEIGHT: 72 IN | HEART RATE: 61 BPM

## 2019-02-06 DIAGNOSIS — E11.65 UNCONTROLLED TYPE 2 DIABETES MELLITUS WITH HYPERGLYCEMIA (HCC): Primary | ICD-10-CM

## 2019-02-06 LAB
GLUCOSE BLD-MCNC: 140 MG/DL
HBA1C MFR BLD: 6.3 %

## 2019-02-06 PROCEDURE — 99213 OFFICE O/P EST LOW 20 MIN: CPT | Performed by: INTERNAL MEDICINE

## 2019-02-06 PROCEDURE — 83036 HEMOGLOBIN GLYCOSYLATED A1C: CPT | Performed by: INTERNAL MEDICINE

## 2019-02-06 PROCEDURE — 82962 GLUCOSE BLOOD TEST: CPT | Performed by: INTERNAL MEDICINE

## 2019-03-20 ENCOUNTER — OFFICE VISIT (OUTPATIENT)
Dept: ENDOCRINOLOGY | Age: 24
End: 2019-03-20
Payer: COMMERCIAL

## 2019-03-20 VITALS
DIASTOLIC BLOOD PRESSURE: 70 MMHG | SYSTOLIC BLOOD PRESSURE: 113 MMHG | BODY MASS INDEX: 26.28 KG/M2 | HEIGHT: 72 IN | WEIGHT: 194 LBS | HEART RATE: 74 BPM

## 2019-03-20 DIAGNOSIS — R53.83 OTHER FATIGUE: ICD-10-CM

## 2019-03-20 LAB
ANION GAP SERPL CALCULATED.3IONS-SCNC: 13 MEQ/L (ref 9–15)
BUN BLDV-MCNC: 22 MG/DL (ref 6–20)
CALCIUM SERPL-MCNC: 9.1 MG/DL (ref 8.5–9.9)
CHLORIDE BLD-SCNC: 99 MEQ/L (ref 95–107)
CO2: 27 MEQ/L (ref 20–31)
CREAT SERPL-MCNC: 1.09 MG/DL (ref 0.7–1.2)
GFR AFRICAN AMERICAN: >60
GFR NON-AFRICAN AMERICAN: >60
GLUCOSE BLD-MCNC: 133 MG/DL (ref 70–99)
GLUCOSE BLD-MCNC: 139 MG/DL
POTASSIUM SERPL-SCNC: 4.1 MEQ/L (ref 3.4–4.9)
SODIUM BLD-SCNC: 139 MEQ/L (ref 135–144)

## 2019-03-20 PROCEDURE — 82962 GLUCOSE BLOOD TEST: CPT | Performed by: INTERNAL MEDICINE

## 2019-03-20 PROCEDURE — 99213 OFFICE O/P EST LOW 20 MIN: CPT | Performed by: INTERNAL MEDICINE

## 2019-03-20 RX ORDER — LANCETS 33 GAUGE
EACH MISCELLANEOUS
Qty: 400 EACH | Refills: 3 | Status: SHIPPED | OUTPATIENT
Start: 2019-03-20 | End: 2022-05-14

## 2019-03-20 RX ORDER — BLOOD-GLUCOSE METER
EACH MISCELLANEOUS
Qty: 1 DEVICE | Refills: 0 | Status: SHIPPED | OUTPATIENT
Start: 2019-03-20 | End: 2022-05-14 | Stop reason: ALTCHOICE

## 2019-03-23 LAB
SEX HORMONE BINDING GLOBULIN: 32 NMOL/L (ref 11–80)
TESTOSTERONE FREE PERCENT: 1.9 % (ref 1.6–2.9)
TESTOSTERONE FREE, CALC: 100 PG/ML (ref 47–244)
TESTOSTERONE TOTAL-MALE: 518 NG/DL (ref 300–1080)

## 2019-06-05 NOTE — TELEPHONE ENCOUNTER
Patient is  requesting medication refill. Please approve or deny this request.    Rx requested:  Requested Prescriptions     Pending Prescriptions Disp Refills    Insulin Pen Needle (NOVOFINE) 32G X 6 MM MISC 100 each 3     Sig: Bid     Pharmacy is  [facility]    Last Office Visit:   3/20/2019      Next Visit Date:  No future appointments.

## 2019-06-10 RX ORDER — PEN NEEDLE, DIABETIC 32GX 5/32"
NEEDLE, DISPOSABLE MISCELLANEOUS
Qty: 100 EACH | Refills: 2 | Status: SHIPPED | OUTPATIENT
Start: 2019-06-10 | End: 2019-10-30 | Stop reason: SDUPTHER

## 2019-07-12 ENCOUNTER — OFFICE VISIT (OUTPATIENT)
Dept: ENDOCRINOLOGY | Age: 24
End: 2019-07-12
Payer: COMMERCIAL

## 2019-07-12 VITALS
WEIGHT: 180 LBS | HEIGHT: 72 IN | DIASTOLIC BLOOD PRESSURE: 70 MMHG | BODY MASS INDEX: 24.38 KG/M2 | SYSTOLIC BLOOD PRESSURE: 106 MMHG | HEART RATE: 68 BPM

## 2019-07-12 LAB
ANION GAP SERPL CALCULATED.3IONS-SCNC: 13 MEQ/L (ref 9–15)
BUN BLDV-MCNC: 21 MG/DL (ref 6–20)
CALCIUM SERPL-MCNC: 9.1 MG/DL (ref 8.5–9.9)
CHLORIDE BLD-SCNC: 96 MEQ/L (ref 95–107)
CHP ED QC CHECK: NORMAL
CO2: 26 MEQ/L (ref 20–31)
CREAT SERPL-MCNC: 1.07 MG/DL (ref 0.7–1.2)
CREATININE URINE: 189.7 MG/DL
GFR AFRICAN AMERICAN: >60
GFR NON-AFRICAN AMERICAN: >60
GLUCOSE BLD-MCNC: 148 MG/DL
GLUCOSE BLD-MCNC: 175 MG/DL (ref 70–99)
HBA1C MFR BLD: 7 %
HBA1C MFR BLD: 7.1 % (ref 4.8–5.9)
MICROALBUMIN UR-MCNC: <1.2 MG/DL
MICROALBUMIN/CREAT UR-RTO: NORMAL MG/G (ref 0–30)
POTASSIUM SERPL-SCNC: 3.9 MEQ/L (ref 3.4–4.9)
SODIUM BLD-SCNC: 135 MEQ/L (ref 135–144)

## 2019-07-12 PROCEDURE — 82962 GLUCOSE BLOOD TEST: CPT | Performed by: INTERNAL MEDICINE

## 2019-07-12 PROCEDURE — 99213 OFFICE O/P EST LOW 20 MIN: CPT | Performed by: INTERNAL MEDICINE

## 2019-07-12 PROCEDURE — 83036 HEMOGLOBIN GLYCOSYLATED A1C: CPT | Performed by: INTERNAL MEDICINE

## 2019-07-12 RX ORDER — FLASH GLUCOSE SENSOR
KIT MISCELLANEOUS
Qty: 2 EACH | Refills: 3 | Status: SHIPPED | OUTPATIENT
Start: 2019-07-12 | End: 2019-10-07 | Stop reason: SDUPTHER

## 2019-07-12 RX ORDER — FLASH GLUCOSE SCANNING READER
EACH MISCELLANEOUS
Qty: 1 DEVICE | Refills: 0 | Status: SHIPPED | OUTPATIENT
Start: 2019-07-12 | End: 2020-09-21 | Stop reason: SDUPTHER

## 2019-10-07 RX ORDER — FLASH GLUCOSE SENSOR
KIT MISCELLANEOUS
Qty: 2 EACH | Refills: 3 | Status: SHIPPED | OUTPATIENT
Start: 2019-10-07 | End: 2020-06-16 | Stop reason: SDUPTHER

## 2019-10-31 ENCOUNTER — OFFICE VISIT (OUTPATIENT)
Dept: ENDOCRINOLOGY | Age: 24
End: 2019-10-31
Payer: COMMERCIAL

## 2019-10-31 VITALS
DIASTOLIC BLOOD PRESSURE: 76 MMHG | RESPIRATION RATE: 18 BRPM | SYSTOLIC BLOOD PRESSURE: 137 MMHG | HEIGHT: 72 IN | OXYGEN SATURATION: 98 % | WEIGHT: 187 LBS | HEART RATE: 76 BPM | BODY MASS INDEX: 25.33 KG/M2

## 2019-10-31 DIAGNOSIS — Z23 NEED FOR INFLUENZA VACCINATION: ICD-10-CM

## 2019-10-31 LAB
CHP ED QC CHECK: NORMAL
GLUCOSE BLD-MCNC: 143 MG/DL
HBA1C MFR BLD: 6.9 %

## 2019-10-31 PROCEDURE — 90471 IMMUNIZATION ADMIN: CPT | Performed by: INTERNAL MEDICINE

## 2019-10-31 PROCEDURE — 83036 HEMOGLOBIN GLYCOSYLATED A1C: CPT | Performed by: INTERNAL MEDICINE

## 2019-10-31 PROCEDURE — 99213 OFFICE O/P EST LOW 20 MIN: CPT | Performed by: INTERNAL MEDICINE

## 2019-10-31 PROCEDURE — 82962 GLUCOSE BLOOD TEST: CPT | Performed by: INTERNAL MEDICINE

## 2019-10-31 PROCEDURE — 90686 IIV4 VACC NO PRSV 0.5 ML IM: CPT | Performed by: INTERNAL MEDICINE

## 2019-11-06 RX ORDER — FLASH GLUCOSE SENSOR
KIT MISCELLANEOUS
Qty: 2 EACH | Refills: 3 | Status: SHIPPED | OUTPATIENT
Start: 2019-11-06 | End: 2020-02-25 | Stop reason: SDUPTHER

## 2019-12-23 ENCOUNTER — TELEPHONE (OUTPATIENT)
Dept: PHARMACY | Facility: CLINIC | Age: 24
End: 2019-12-23

## 2019-12-23 NOTE — TELEPHONE ENCOUNTER
CLINICAL PHARMACY CONSULT - Medication Conversion Initiative     Sujata Cleveland is a 27 y.o. male identified with current prescription for Novolog. Starting 1/1/2020, Novolog (insulin aspart) is going to be excluded from patient's pharmacy benefit. For ministry and patient cost savings, a conversion has been identified to Humalog (insulin lispro). A1c results:  Component Value Date    LABA1C 6.9 10/31/2019    LABA1C 7.0 07/12/2019    LABA1C 7.1 (H) 07/12/2019     Plan:  - Medications:   Suggest discontinue Novolog 70/30 & change to Humalog 70/30    - Labs/follow up:   Per provider discretion     - Kind reminder of opportunity to enroll in DM program   Medication will be free for patient through program (if filled at [address])    First attempt to reach patient to review above. Unable to leave message - whenever would try to speak, voicemail would say it could not hear me and kept requesting I start over. Will send [employer] message and continue to attempt as appropriate.      Sean Favre, PharmD, Rashmi Irving  Clinical Pharmacy Specialist  [address] [place] [address]  [phone], Option 7    =======================================================    For Pharmacy Admin Tracking Only  PHSO: Yes  Time Spent (min): 5

## 2020-01-13 ENCOUNTER — HOSPITAL ENCOUNTER (OUTPATIENT)
Dept: OCCUPATIONAL THERAPY | Age: 25
Setting detail: THERAPIES SERIES
Discharge: HOME | End: 2020-01-13
Payer: COMMERCIAL

## 2020-01-13 PROCEDURE — 97165 OT EVAL LOW COMPLEX 30 MIN: CPT

## 2020-01-13 RX ORDER — INSULIN LISPRO 100 [IU]/ML
INJECTION, SUSPENSION SUBCUTANEOUS
Qty: 15 PEN | Refills: 1 | Status: SHIPPED | OUTPATIENT
Start: 2020-01-13 | End: 2020-06-16 | Stop reason: SDUPTHER

## 2020-01-15 ENCOUNTER — HOSPITAL ENCOUNTER (OUTPATIENT)
Dept: OCCUPATIONAL THERAPY | Age: 25
Setting detail: THERAPIES SERIES
Discharge: HOME | End: 2020-01-15
Payer: COMMERCIAL

## 2020-01-15 PROCEDURE — 97530 THERAPEUTIC ACTIVITIES: CPT

## 2020-01-15 PROCEDURE — 97140 MANUAL THERAPY 1/> REGIONS: CPT

## 2020-01-15 NOTE — PROGRESS NOTES
Occupational Therapy  Daily Note     Name: Hemalatha Ortiz  : 1995  MRN: [id number]       Visit Information:    2    Date: 1/15/2020  Time: 9:41 am  OT Manual therapy 10 minutes for 1 unit(s)   OT Therapeutic activities 45 minutes for 3 units. Subjective:     Patient into department with report that shoulder and elbow are improved after 1 treatment session. Pain rating:     Pre-treatment pain:   1 out of 10 pain right shoulder and elbow. Pain after treatment:      1 out of 10         Focus of treatment was on the following:   deceasing fascial restrictions and strengthening scapula and shoulder RUE. Modality:   moist heat right shoulder/trapezius muscle area. Parameters:       Manual:  Performed deep soft tissue mobilization of right trapezius muscle and scalenes. Pt. tolerated deep pressure, cross friction massage and prolonged stretch to this area. MFR: Upper extremity: Soft tissue mobility (identify area): right trapezius muscle. Treatment Activity: Patient performed 1 set of 10 reps with 4 lb. dumbbell BUEs to complete supine shoulder flexion and UE chest presses. Prone prop and 4 pt. positioning for stability tasks both right shoulder. In prone, pt. completed shoulder complex and rotator cuff exercises with 4 lb. dumbbell with good results. Completed session with shoulder/scapula stabilization tasks in seated position. Fatigue noted in RUE. Instructed pt. in postural set activity to perform intermittently throughout his daily activities. Comments: Pt. reported fatigue in RUE, however no pain. Assessment:   Pt tolerated treatment well. Pt. noted to have fatigue in RUE without increased pain. Pt. demonstrates good understanding of postural control HEP. Pt. to see physician 20. O.T. will consult with pt. and physician regarding POC.     Plan:   Continue [address] [facility], OTR/L   1/15/2020  11:28 AM

## 2020-01-20 ENCOUNTER — HOSPITAL ENCOUNTER (OUTPATIENT)
Dept: OCCUPATIONAL THERAPY | Age: 25
Setting detail: THERAPIES SERIES
Discharge: HOME | End: 2020-01-20
Payer: COMMERCIAL

## 2020-01-20 PROCEDURE — 97530 THERAPEUTIC ACTIVITIES: CPT

## 2020-01-23 ENCOUNTER — HOSPITAL ENCOUNTER (OUTPATIENT)
Dept: OCCUPATIONAL THERAPY | Age: 25
Setting detail: THERAPIES SERIES
Discharge: HOME | End: 2020-01-23
Payer: COMMERCIAL

## 2020-01-23 PROCEDURE — 97530 THERAPEUTIC ACTIVITIES: CPT

## 2020-02-25 RX ORDER — FLASH GLUCOSE SENSOR
KIT MISCELLANEOUS
Qty: 2 EACH | Refills: 3 | Status: SHIPPED | OUTPATIENT
Start: 2020-02-25 | End: 2021-10-09

## 2020-06-16 ENCOUNTER — VIRTUAL VISIT (OUTPATIENT)
Dept: ENDOCRINOLOGY | Age: 25
End: 2020-06-16
Payer: COMMERCIAL

## 2020-06-16 PROCEDURE — 99213 OFFICE O/P EST LOW 20 MIN: CPT | Performed by: INTERNAL MEDICINE

## 2020-06-16 RX ORDER — INSULIN LISPRO 100 [IU]/ML
INJECTION, SUSPENSION SUBCUTANEOUS
Qty: 15 PEN | Refills: 3 | Status: SHIPPED | OUTPATIENT
Start: 2020-06-16 | End: 2020-07-08 | Stop reason: SDUPTHER

## 2020-06-16 RX ORDER — FLASH GLUCOSE SENSOR
KIT MISCELLANEOUS
Qty: 2 EACH | Refills: 3 | Status: SHIPPED | OUTPATIENT
Start: 2020-06-16 | End: 2020-09-23

## 2020-06-16 NOTE — PROGRESS NOTES
2020    TELEHEALTH EVALUATION -- Audio/Visual (During ZIRJW-61 public health emergency)    Due to [place] 19 outbreak, patient's office visit was converted to a virtual visit. Patient was contacted and agreed to proceed with a virtual visit via Telephone Visit  The risks and benefits of converting to a virtual visit were discussed in light of the current infectious disease epidemic. Patient also understood that insurance coverage and co-pays are up to their individual insurance plans. HPI: Patient made aware this a telephone visit billable visit continue current dose of Humalog 75/25    Jory Nelson (:  1995) has requested an audio/video evaluation for the following concern(s):    Type 2 diabetes on 75/25 Humalog 20-26 units bid   requesting refills on insulin no recent labs to review last A1c was 6.9 in     Results for Tabby Conn (MRN [id number]) as of 2020 16:01   Ref. Range 2019 15:33 10/31/2019 15:13 10/31/2019 15:23   Hemoglobin A1C Latest Units: % 7.0  6.9   Blood sugars have been averaging in the 1 30-1 40 range no hypoglycemia    Patient Active Problem List   Diagnosis    Acne    Elevated liver enzymes    Atypical nevus    Verruca vulgaris    Type II diabetes mellitus, uncontrolled ([facility] [address]     Time spent with patient was 60 minutes    Review of Systems    Prior to Visit Medications    Medication Sig Taking?  Authorizing Provider   Continuous Blood Gluc Sensor (FREESTYLE SABRINA 14 DAY SENSOR) MISC USE AS DIRECTED EVERY 2 WEEKS  Kwadwo Archer MD   insulin lispro protamine & lispro (HUMALOG MIX 75/25 KWIKPEN) (75-25) 100 UNIT per ML SUPN injection pen Inject 20-26 units bid  Kwadwo Archer MD   Insulin Pen Needle (TRUEPLUS PEN NEEDLES) 32G X 4 MM MISC USE TWICE DAILY  Kwadwo Archer MD   Insulin Pen Needle (NOVOFINE) 32G X 6 MM MISC Use 6 times a day  Kwadwo Archer MD   Insulin Pen Needle (TRUEPLUS PEN NEEDLES) 32G X 4 MM Pan Herron A DAY WITH INSULIN  Nell Brown MD

## 2020-07-08 ENCOUNTER — OFFICE VISIT (OUTPATIENT)
Dept: ENDOCRINOLOGY | Age: 25
End: 2020-07-08
Payer: COMMERCIAL

## 2020-07-08 VITALS
DIASTOLIC BLOOD PRESSURE: 74 MMHG | HEART RATE: 69 BPM | WEIGHT: 201 LBS | BODY MASS INDEX: 26.64 KG/M2 | OXYGEN SATURATION: 96 % | HEIGHT: 73 IN | SYSTOLIC BLOOD PRESSURE: 124 MMHG

## 2020-07-08 LAB
CHP ED QC CHECK: NORMAL
GLUCOSE BLD-MCNC: 176 MG/DL
HBA1C MFR BLD: 7.4 %

## 2020-07-08 PROCEDURE — 82962 GLUCOSE BLOOD TEST: CPT | Performed by: INTERNAL MEDICINE

## 2020-07-08 PROCEDURE — 99214 OFFICE O/P EST MOD 30 MIN: CPT | Performed by: INTERNAL MEDICINE

## 2020-07-08 PROCEDURE — 83036 HEMOGLOBIN GLYCOSYLATED A1C: CPT | Performed by: INTERNAL MEDICINE

## 2020-07-08 PROCEDURE — 3051F HG A1C>EQUAL 7.0%<8.0%: CPT | Performed by: INTERNAL MEDICINE

## 2020-07-08 RX ORDER — INSULIN LISPRO 100 [IU]/ML
INJECTION, SUSPENSION SUBCUTANEOUS
Qty: 15 PEN | Refills: 3 | Status: SHIPPED | OUTPATIENT
Start: 2020-07-08 | End: 2021-03-09 | Stop reason: SDUPTHER

## 2020-07-08 ASSESSMENT — ENCOUNTER SYMPTOMS
BLURRED VISION: 0
VISUAL CHANGE: 0

## 2020-07-08 NOTE — PROGRESS NOTES
Subjective:      Patient ID: Deborah Scott is a 27 y.o. male. 4-week follow-up on type 2 diabetes patient is on insulin 75/25 twice a day blood sugars have been high postprandial and also all throughout the day he has had C-peptide's done which is on the lower end of normal range is also been working outdoors which could affect his sugars A1c was 7.4 today  Diabetes   He presents for his follow-up diabetic visit. He has type 2 diabetes mellitus. His disease course has been worsening. There are no hypoglycemic associated symptoms. Pertinent negatives for diabetes include no blurred vision, no polydipsia, no polyuria and no visual change. There are no hypoglycemic complications. Current diabetic treatment includes insulin injections. He is currently taking insulin pre-breakfast and pre-dinner. His overall blood glucose range is 180-200 mg/dl. (Lab Results       Component                Value               Date                       LABA1C                   7.4                 07/08/2020                Reviewed 2-week CGM report on his freestyle sandi blood sugars have been high especially after breakfast 2-50 300 range overall 2-week average was 170 no severe hypoglycemia overall average throughout the day are between 1 50-1 80 range highest blood sugars especially after breakfast and after dinnertime  )     Results for Lucia De Luna (MRN [id number]) as of 7/8/2020 18:08   Ref. Range 8/10/2018 10:57 9/5/2018 14:37   C-Peptide Latest Ref Range: 1.1 - 4.4 ng/mL 1.6 1.2       Results for Lucia De Luna (MRN [id number]) as of 7/8/2020 18:08   Ref.  Range 10/31/2019 15:13 10/31/2019 15:23 12/23/2019 13:46 7/8/2020 15:23 7/8/2020 15:38   Glucose Latest Units: mg/dL 143   176    Hemoglobin A1C Latest Units: %  6.9   7.4     Patient Active Problem List   Diagnosis    Acne    Elevated liver enzymes    Atypical nevus    Verruca vulgaris    Type II diabetes mellitus, uncontrolled (HCC)     No Known Allergies      Current Outpatient Medications:     insulin lispro protamine & lispro (HUMALOG MIX 75/25 KWIKPEN) (75-25) 100 UNIT per ML SUPN injection pen, Inject 24-30  units bid, Disp: 15 pen, Rfl: 3    Insulin Pen Needle (NOVOFINE) 32G X 6 MM MISC, Use 6 times a day, Disp: 200 each, Rfl: 5    Continuous Blood Gluc Sensor (FREESTYLE SABRINA 14 DAY SENSOR) MISC, USE AS DIRECTED EVERY 2 WEEKS, Disp: 2 each, Rfl: 3    Continuous Blood Gluc Sensor (FREESTYLE SABRINA 14 DAY SENSOR) MISC, USE AS DIRECTED EVERY 2 WEEKS, Disp: 2 each, Rfl: 3    Insulin Pen Needle (TRUEPLUS PEN NEEDLES) 32G X 4 MM MISC, USE TWICE DAILY, Disp: 100 each, Rfl: 3    Insulin Pen Needle (TRUEPLUS PEN NEEDLES) 32G X 4 MM MISC, USE TWO TIMES A DAY WITH INSULIN, Disp: 100 each, Rfl: 2    Continuous Blood Gluc  (FREESTYLE SABRNIA 14 DAY READER) ANY, As directed, Disp: 1 Device, Rfl: 00    blood glucose test strips (AGAMATRIX AMP TEST) strip, Pt test 4x daily dx e10.65, Disp: 400 each, Rfl: 3    Blood Glucose Monitoring Suppl (AGAMATRIX AMP) ANY, Please give 1 meter dx e10.65, Disp: 1 Device, Rfl: 0    AGAMATRIX ULTRA-THIN LANCETS MISC, Pt tests 4x daily dx e10.65, Disp: 400 each, Rfl: 3    insulin aspart protamine-insulin aspart (NOVOLOG MIX 70/30 FLEXPEN) (70-30) 100 UNIT/ML injection, Inject 20-26  units bid (Patient not taking: Reported on 7/8/2020), Disp: 15 pen, Rfl: 3      Review of Systems   Eyes: Negative for blurred vision. Endocrine: Negative for polydipsia and polyuria. Vitals:    07/08/20 1510   BP: 124/74   Pulse: 69   SpO2: 96%   Weight: 201 lb (91.2 kg)   Height: 6' 1\" (1.854 m)       Objective:   Physical Exam  Constitutional:       Appearance: Normal appearance. HENT:      Head: Normocephalic and atraumatic. Right Ear: External ear normal.      Left Ear: External ear normal.   Neck:      Musculoskeletal: Normal range of motion and neck supple. Cardiovascular:      Rate and Rhythm: Normal rate and regular rhythm. Musculoskeletal: Normal range of motion. Skin:     General: Skin is warm. Neurological:      General: No focal deficit present. Mental Status: He is alert. Psychiatric:         Mood and Affect: Mood normal.         Assessment:       Diagnosis Orders   1.  Uncontrolled type 2 diabetes mellitus with complication, with long-term current use of insulin ([facility])  POCT Glucose    POCT glycosylated hemoglobin (Hb A1C)    C-Peptide    Basic Metabolic Panel    Glutamic Acid Decarboxylase           Plan:      Orders Placed This Encounter   Procedures    C-Peptide     Standing Status:   Future     Standing Expiration Date:   7/8/2021    Basic Metabolic Panel     Standing Status:   Future     Standing Expiration Date:   7/8/2021    Glutamic Acid Decarboxylase     Standing Status:   Future     Standing Expiration Date:   7/8/2021    POCT Glucose    POCT glycosylated hemoglobin (Hb A1C)       Orders Placed This Encounter   Medications    insulin lispro protamine & lispro (HUMALOG MIX 75/25 KWIKPEN) (75-25) 100 UNIT per ML SUPN injection pen     Sig: Inject 24-30  units bid     Dispense:  15 pen     Refill:  3     Increase 75/25 Humalog to 24 to 30 units twice a day patient to have repeat C-peptide include insulin antibodies discussed 3 injections a day or insulin pump patient declined more than 50% of 25 minutes spent in patient education counseling        Elder Lewis MD

## 2020-08-26 LAB
ANION GAP SERPL CALCULATED.3IONS-SCNC: 9 MEQ/L (ref 9–15)
BUN BLDV-MCNC: 28 MG/DL (ref 6–20)
CALCIUM SERPL-MCNC: 9 MG/DL (ref 8.5–9.9)
CHLORIDE BLD-SCNC: 102 MEQ/L (ref 95–107)
CO2: 27 MEQ/L (ref 20–31)
CREAT SERPL-MCNC: 1 MG/DL (ref 0.7–1.2)
CREATININE URINE: 139.5 MG/DL
GFR AFRICAN AMERICAN: >60
GFR NON-AFRICAN AMERICAN: >60
GLUCOSE BLD-MCNC: 291 MG/DL (ref 70–99)
HBA1C MFR BLD: 8.4 % (ref 4.8–5.9)
MICROALBUMIN UR-MCNC: <1.2 MG/DL
MICROALBUMIN/CREAT UR-RTO: NORMAL MG/G (ref 0–30)
POTASSIUM SERPL-SCNC: 3.9 MEQ/L (ref 3.4–4.9)
SODIUM BLD-SCNC: 138 MEQ/L (ref 135–144)

## 2020-08-28 LAB
C-PEPTIDE: 0.9 NG/ML (ref 1.1–4.4)
SEX HORMONE BINDING GLOBULIN: 29 NMOL/L (ref 11–80)
TESTOSTERONE FREE PERCENT: 1.9 % (ref 1.6–2.9)
TESTOSTERONE FREE, CALC: 57 PG/ML (ref 47–244)
TESTOSTERONE TOTAL-MALE: 297 NG/DL (ref 300–1080)

## 2020-08-30 LAB — GLUTAMIC ACID DECARB AB: 169.4 IU/ML (ref 0–5)

## 2020-09-21 RX ORDER — FLASH GLUCOSE SCANNING READER
EACH MISCELLANEOUS
Qty: 1 DEVICE | Refills: 0 | Status: SHIPPED | OUTPATIENT
Start: 2020-09-21

## 2020-09-23 RX ORDER — FLASH GLUCOSE SENSOR
KIT MISCELLANEOUS
Qty: 2 EACH | Refills: 3 | Status: SHIPPED | OUTPATIENT
Start: 2020-09-23 | End: 2021-10-09 | Stop reason: SDUPTHER

## 2020-09-25 RX ORDER — FLASH GLUCOSE SENSOR
KIT MISCELLANEOUS
Qty: 2 EACH | Refills: 3 | Status: SHIPPED | OUTPATIENT
Start: 2020-09-25 | End: 2021-03-09 | Stop reason: SDUPTHER

## 2021-01-20 ENCOUNTER — VIRTUAL VISIT (OUTPATIENT)
Dept: FAMILY MEDICINE CLINIC | Age: 26
End: 2021-01-20
Payer: COMMERCIAL

## 2021-01-20 DIAGNOSIS — Z20.822 ENCOUNTER BY TELEHEALTH FOR SUSPECTED COVID-19: ICD-10-CM

## 2021-01-20 DIAGNOSIS — Z20.822 ENCOUNTER BY TELEHEALTH FOR SUSPECTED COVID-19: Primary | ICD-10-CM

## 2021-01-20 PROCEDURE — 99442 PR PHYS/QHP TELEPHONE EVALUATION 11-20 MIN: CPT | Performed by: NURSE PRACTITIONER

## 2021-01-20 ASSESSMENT — ENCOUNTER SYMPTOMS
COUGH: 1
SHORTNESS OF BREATH: 0
NAUSEA: 0
SORE THROAT: 0
VOMITING: 0
DIARRHEA: 0
RHINORRHEA: 0
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
## 2021-01-21 LAB
SARS-COV-2: NOT DETECTED
SOURCE: NORMAL

## 2021-02-12 ENCOUNTER — OFFICE VISIT (OUTPATIENT)
Dept: PRIMARY CARE CLINIC | Age: 26
End: 2021-02-12
Payer: COMMERCIAL

## 2021-02-12 VITALS
HEART RATE: 83 BPM | TEMPERATURE: 97.1 F | RESPIRATION RATE: 14 BRPM | SYSTOLIC BLOOD PRESSURE: 128 MMHG | HEIGHT: 73 IN | BODY MASS INDEX: 24.39 KG/M2 | WEIGHT: 184 LBS | OXYGEN SATURATION: 98 % | DIASTOLIC BLOOD PRESSURE: 80 MMHG

## 2021-02-12 DIAGNOSIS — R53.83 FATIGUE, UNSPECIFIED TYPE: ICD-10-CM

## 2021-02-12 DIAGNOSIS — R61 NIGHT SWEATS: Primary | ICD-10-CM

## 2021-02-12 PROCEDURE — 99213 OFFICE O/P EST LOW 20 MIN: CPT | Performed by: INTERNAL MEDICINE

## 2021-02-12 ASSESSMENT — PATIENT HEALTH QUESTIONNAIRE - PHQ9
SUM OF ALL RESPONSES TO PHQ9 QUESTIONS 1 & 2: 0
SUM OF ALL RESPONSES TO PHQ QUESTIONS 1-9: 0
SUM OF ALL RESPONSES TO PHQ QUESTIONS 1-9: 0
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
## 2021-02-13 DIAGNOSIS — R53.83 FATIGUE, UNSPECIFIED TYPE: ICD-10-CM

## 2021-02-13 LAB
FOLATE: 10.2 NG/ML (ref 7.3–26.1)
TOTAL CK: 1013 U/L (ref 0–190)
TSH REFLEX: 1.4 UIU/ML (ref 0.44–3.86)
VITAMIN B-12: 1182 PG/ML (ref 232–1245)

## 2021-02-16 LAB
EPSTEIN BARR VIRUS NUCLEAR AB IGG: 44.4 U/ML (ref 0–21.9)
EPSTEIN-BARR EARLY ANTIGEN ANTIBODY: 12.7 U/ML (ref 0–10.9)
EPSTEIN-BARR VCA IGG: 248 U/ML (ref 0–21.9)
EPSTEIN-BARR VCA IGM: >160 U/ML (ref 0–43.9)
SEX HORMONE BINDING GLOBULIN: 30 NMOL/L (ref 11–80)
TESTOSTERONE FREE PERCENT: 2 % (ref 1.6–2.9)
TESTOSTERONE FREE, CALC: 101 PG/ML (ref 47–244)
TESTOSTERONE TOTAL-MALE: 507 NG/DL (ref 300–1080)

## 2021-02-17 LAB
ANA PATTERN: ABNORMAL
ANA TITER: ABNORMAL
ANTINUCLEAR AB INTERPRETIVE COMMENT: ABNORMAL
ANTINUCLEAR ANTIBODY, HEP-2, IGG: DETECTED

## 2021-02-21 DIAGNOSIS — E11.65 UNCONTROLLED TYPE 2 DIABETES MELLITUS WITH HYPERGLYCEMIA (HCC): ICD-10-CM

## 2021-02-21 DIAGNOSIS — M62.82 NON-TRAUMATIC RHABDOMYOLYSIS: Primary | ICD-10-CM

## 2021-02-21 DIAGNOSIS — R76.8 ANA POSITIVE: Primary | ICD-10-CM

## 2021-02-21 ASSESSMENT — ENCOUNTER SYMPTOMS
CHOKING: 0
COUGH: 0
WHEEZING: 0
SHORTNESS OF BREATH: 0
PHOTOPHOBIA: 0
VOICE CHANGE: 0
TROUBLE SWALLOWING: 0
CHEST TIGHTNESS: 0
VOMITING: 0
NAUSEA: 0

## 2021-03-09 ENCOUNTER — VIRTUAL VISIT (OUTPATIENT)
Dept: ENDOCRINOLOGY | Age: 26
End: 2021-03-09
Payer: COMMERCIAL

## 2021-03-09 PROCEDURE — 99442 PR PHYS/QHP TELEPHONE EVALUATION 11-20 MIN: CPT | Performed by: INTERNAL MEDICINE

## 2021-03-09 RX ORDER — INSULIN LISPRO 100 [IU]/ML
INJECTION, SUSPENSION SUBCUTANEOUS
Qty: 15 PEN | Refills: 3 | Status: SHIPPED | OUTPATIENT
Start: 2021-03-09 | End: 2022-05-14 | Stop reason: ALTCHOICE

## 2021-03-09 RX ORDER — FLASH GLUCOSE SENSOR
KIT MISCELLANEOUS
Qty: 2 EACH | Refills: 3 | Status: SHIPPED | OUTPATIENT
Start: 2021-03-09 | End: 2021-06-01 | Stop reason: SDUPTHER

## 2021-03-09 NOTE — PROGRESS NOTES
3/9/2021    TELEHEALTH EVALUATION -- Audio/Visual (During HZZYV-48 public health emergency)    Due to [place] 19 outbreak, patient's office visit was converted to a virtual visit. Patient was contacted and agreed to proceed with a virtual visit via Telephone Visit  The risks and benefits of converting to a virtual visit were discussed in light of the current infectious disease epidemic. Patient also understood that insurance coverage and co-pays are up to their individual insurance plans. HPI: Telephone visit patient was at home I was at my office follow-up on type 2 diabetes patient also has a history of fluctuating testosterone level history of anabolic steroid use most current testosterone was normal was seen in primary care Dr. Giulia Fuller office flulike symptoms test for mononucleosis Margot-Mills virus was positive also had positive SYDNI rest of the labs were normal is also using freestyle sandi testing multiple times daily average blood sugars in the 140s last A1c was 8+ requesting refills for insulin freestyle sandi patient is scheduled to see rheumatologist    Malika Wood (:  1995) has requested an audio/video evaluation for the following concern(s):      Results for Kristen Willis (MRN [id number]) as of 3/9/2021 14:42   Ref. Range 3/20/2019 15:24 2020 14:22 2021 11:36   Total Testosterone Latest Ref Range: 300 - 1080 ng/dL 518 297 (L) 507       Results for Kristen Willis (MRN [id number]) as of 3/9/2021 14:42   Ref.  Range 2021 11:36   Total CK Latest Ref Range: 0 - 190 U/L 1,013 (H)   Sex Hormone Binding Latest Ref Range: 11 - 80 nmol/L 30   Testosterone Free, Calc Latest Ref Range: 47 - 244 pg/mL 101   Total Testosterone Latest Ref Range: 300 - 1080 ng/dL 507   Testosterone % Free Latest Ref Range: 1.6 - 2.9 % 2.0   TSH Latest Ref Range: 0.440 - 3.860 uIU/mL 1.400   Folate Latest Ref Range: 7.3 - 26.1 ng/mL 10.2   Vitamin B-12 Latest Ref Range: 232 - 1245 pg/mL 1182   EBV VCA IgG Latest Ref Range: 0.0 - 21.9 U/mL 248.0 (H)   EBV VCA IgM Latest Ref Range: 0.0 - 43.9 U/mL >160.0 (H)   EBV Early Ag Ab Latest Ref Range: 0.0 - 10.9 U/mL 12.7 (H)   Margot Barr virus nuclear Ab IgG Latest Ref Range: 0.0 - 21.9 U/mL 44.4 (H)   SYDNI Pattern Unknown Speckled (A)   SYDNI TITER Unknown 1:80 (A)   Antinuclear AB Interpretive Comment Unknown See Note   Antinuclear Antibody, Hep-2, IGG Latest Ref Range: <1:80  Detected (H)       Results for Claire San (MRN [id number]) as of 3/9/2021 14:42   Ref. Range 7/8/2020 15:38 8/26/2020 14:22   Sodium Latest Ref Range: 135 - 144 mEq/L  138   Potassium Latest Ref Range: 3.4 - 4.9 mEq/L  3.9   Chloride Latest Ref Range: 95 - 107 mEq/L  102   CO2 Latest Ref Range: 20 - 31 mEq/L  27   BUN Latest Ref Range: 6 - 20 mg/dL  28 (H)   Creatinine Latest Ref Range: 0.70 - 1.20 mg/dL  1.00   Anion Gap Latest Ref Range: 9 - 15 mEq/L  9   GFR Non- Latest Ref Range: >60   >60.0   GFR African American Latest Ref Range: >60   >60.0   Glucose Latest Ref Range: 70 - 99 mg/dL  291 (H)   Calcium Latest Ref Range: 8.5 - 9.9 mg/dL  9.0   Hemoglobin A1C Latest Ref Range: 4.8 - 5.9 % 7.4 8.4 (H)     Patient Active Problem List   Diagnosis    Acne    Elevated liver enzymes    Atypical nevus    Verruca vulgaris    Type II diabetes mellitus, uncontrolled (HCC)         Review of Systems   Constitutional: Positive for fatigue. All other systems reviewed and are negative. Prior to Visit Medications    Medication Sig Taking?  Authorizing Provider   Continuous Blood Gluc Sensor (FREESTYLE SABRINA 14 DAY SENSOR) MISC USE AS DIRECTED EVERY 2 WEEKS  Kwadwo Archer MD   Continuous Blood Gluc Sensor (FREESTYLE SABRINA 14 DAY SENSOR) MISC USE AS DIRECTED EVERY 2 WEEKS  Kwadwo Archer MD   Continuous Blood Gluc  (FREESTYLE SABRINA 14 DAY READER) ANY As directed  Jeanette Berg MD   insulin lispro protamine & lispro (HUMALOG MIX 75/25 KWIKPEN) (75-25) 100 UNIT per ML SUPN injection pen Inject 24-30  units bid  Kwadwo Archer MD   Insulin Pen Needle (NOVOFINE) 32G X 6 MM MISC Use 6 times a day  Kwadwo Archer MD   Continuous Blood Gluc Sensor (FREESTYLE SABRINA 14 DAY SENSOR) MISC USE AS DIRECTED EVERY 2 WEEKS  Kwadwo Archer MD   Insulin Pen Needle (TRUEPLUS PEN NEEDLES) 32G X 4 MM MISC USE TWICE DAILY  Kwadwo Archer MD   Insulin Pen Needle (Taylor ) 32G X 4 MM Leonor Velasquez MD   blood glucose test strips (AGAMATRIX AMP TEST) strip Pt test 4x daily dx e10.65  Eriberto Gutierrez MD   Blood Glucose Monitoring Suppl (AGAMATRIX AMP) ANY Please give 1 meter dx e10.65  Eriberto Gutierrez MD   AGAMATRIX ULTRA-THIN LANCETS MISC Pt tests 4x daily dx e10.65  Eriberto Gutierrez MD       Social History     Tobacco Use    Smoking status: Never Smoker    Smokeless tobacco: Never Used   Substance Use Topics    Alcohol use: No    Drug use: Yes     Types: Marijuana     Comment: tested positive on tox screen ER            PHYSICAL EXAMINATION:  [ INSTRUCTIONS:  \"[x]\" Indicates a positive item  \"[]\" Indicates a negative item  -- DELETE ALL ITEMS NOT EXAMINED]  [] Alert  [] Oriented to person/place/time    [] No apparent distress  [] Toxic appearing    [] Face flushed appearing [] Sclera clear  [] Lips are cyanotic      [] Breathing appears normal  [] Appears tachypneic      [] Rash on visible skin    [] Cranial Nerves II-XII grossly intact    [] Motor grossly intact in visible upper extremities    [] Motor grossly intact in visible lower extremities    [] Normal Mood  [] Anxious appearing    [] Depressed appearing  [] Confused appearing      [] Poor short term memory  [] Poor long term memory    [] OTHER:      Due to this being a TeleHealth encounter, evaluation of the following organ systems is limited: Vitals/Constitutional/EENT/Resp/CV/GI//MS/Neuro/Skin/Heme-Lymph-Imm. ASSESSMENT/PLAN:     Diagnosis Orders   1.  Uncontrolled type 2 diabetes mellitus with complication, with long-term current use of insulin ([facility])  Insulin Pen Needle (NOVOFINE) 32G X 6 MM MISC    Basic Metabolic Panel    Hemoglobin A1C     Orders Placed This Encounter   Procedures    Basic Metabolic Panel     Standing Status:   Future     Standing Expiration Date:   3/9/2022    Hemoglobin A1C     Standing Status:   Future     Standing Expiration Date:   3/9/2022    Microalbumin / Creatinine Urine Ratio     Standing Status:   Future     Standing Expiration Date:   3/9/2022       Orders Placed This Encounter   Medications    insulin lispro protamine & lispro (HUMALOG MIX 75/25 KWIKPEN) (75-25) 100 UNIT per ML SUPN injection pen     Sig: Inject 24-30  units bid     Dispense:  15 pen     Refill:  3    Insulin Pen Needle (NOVOFINE) 32G X 6 MM MISC     Sig: Use 6 times a day     Dispense:  200 each     Refill:  5    Continuous Blood Gluc Sensor (FREESTYLE SABRINA 14 DAY SENSOR) MISC     Sig: USE AS DIRECTED EVERY 2 WEEKS     Dispense:  2 each     Refill:  3     Continue current dose of Humalog mix 24 to 30 units twice a day freestyle sabrina continue to use patient to follow-up in couple of months for repeat BMP A1c    An  electronic signature was used to authenticate this note. --Dashawn Whitaker MD on 3/9/2021 at 2:42 PM        Pursuant to the emergency declaration under the [facility] waiver authority and the [employer] and [employer] Act, this Virtual  Visit was conducted, with patient's consent, to reduce the patient's risk of exposure to COVID-19 and provide continuity of care for an established patient. Services were provided through a video synchronous discussion virtually to substitute for in-person clinic visit.

## 2021-05-04 ENCOUNTER — VIRTUAL VISIT (OUTPATIENT)
Dept: ENDOCRINOLOGY | Age: 26
End: 2021-05-04
Payer: COMMERCIAL

## 2021-05-04 PROCEDURE — 99442 PR PHYS/QHP TELEPHONE EVALUATION 11-20 MIN: CPT | Performed by: INTERNAL MEDICINE

## 2021-05-04 NOTE — PROGRESS NOTES
2021    TELEHEALTH EVALUATION -- Audio/Visual (During NJMOJ-25 public health emergency)    Due to COVID 19 outbreak, patient's office visit was converted to a virtual visit. Patient was contacted and agreed to proceed with a virtual visit via Telephone Visit  The risks and benefits of converting to a virtual visit were discussed in light of the current infectious disease epidemic. Patient also understood that insurance coverage and co-pays are up to their individual insurance plans.     HPI: Follow-up on type 1 diabetes patient on 75/25 Humalog 24 to 30 units twice daily blood sugars have been fluctuating averages in the upper 100s to low 200 range patient also wants to look into getting a [employer] insulin pump      Lab Results   Component Value Date    LABA1C 8.4 (H) 2020       Julissa Rivas (:  1995) has requested an audio/video evaluation for the following concern(s):    Patient Active Problem List   Diagnosis    Acne    Elevated liver enzymes    Atypical nevus    Verruca vulgaris    Type II diabetes mellitus, uncontrolled ([address]     No Known Allergies      Current Outpatient Medications:     insulin lispro protamine & lispro (HUMALOG MIX 75/25 KWIKPEN) (75-25) 100 UNIT per ML SUPN injection pen, Inject 24-30  units bid, Disp: 15 pen, Rfl: 3    Insulin Pen Needle (NOVOFINE) 32G X 6 MM MISC, Use 6 times a day, Disp: 200 each, Rfl: 5    Continuous Blood Gluc Sensor (FREESTYLE SABRINA 14 DAY SENSOR) MISC, USE AS DIRECTED EVERY 2 WEEKS, Disp: 2 each, Rfl: 3    Continuous Blood Gluc Sensor (FREESTYLE SABRINA 14 DAY SENSOR) MISC, USE AS DIRECTED EVERY 2 WEEKS, Disp: 2 each, Rfl: 3    Continuous Blood Gluc  (FREESTYLE SABRINA 14 DAY READER) ANY, As directed, Disp: 1 Device, Rfl: 00    Continuous Blood Gluc Sensor (FREESTYLE SABRINA 14 DAY SENSOR) MISC, USE AS DIRECTED EVERY 2 WEEKS, Disp: 2 each, Rfl: 3    Insulin Pen Needle (TRUEPLUS PEN NEEDLES) 32G X 4 MM MISC, USE TWICE DAILY, Disp: 100 each, Rfl: 3    Insulin Pen Needle (TRUEPLUS PEN NEEDLES) 32G X 4 MM MISC, USE TWO TIMES A DAY WITH INSULIN, Disp: 100 each, Rfl: 2    blood glucose test strips (AGAMATRIX AMP TEST) strip, Pt test 4x daily dx e10.65, Disp: 400 each, Rfl: 3    Blood Glucose Monitoring Suppl (AGAMATRIX AMP) ANY, Please give 1 meter dx e10.65, Disp: 1 Device, Rfl: 0    AGAMATRIX ULTRA-THIN LANCETS MISC, Pt tests 4x daily dx e10.65, Disp: 400 each, Rfl: 3      Review of Systems   Endocrine: Negative. All other systems reviewed and are negative. Prior to Visit Medications    Medication Sig Taking?  Authorizing Provider   insulin lispro protamine & lispro (HUMALOG MIX 75/25 KWIKPEN) (75-25) 100 UNIT per ML SUPN injection pen Inject 24-30  units bid  Kwadwo Archer MD   Insulin Pen Needle (NOVOFINE) 32G X 6 MM MISC Use 6 times a day  Kwadwo Archer MD   Continuous Blood Gluc Sensor (FREESTYLE SABRINA 14 DAY SENSOR) MISC USE AS DIRECTED EVERY 2 WEEKS  Kwadwo Archer MD   Continuous Blood Gluc Sensor (FREESTYLE SABRINA 14 DAY SENSOR) MISC USE AS DIRECTED EVERY 2 WEEKS  Kwadwo Archer MD   Continuous Blood Gluc  (FREESTYLE SABRINA 14 DAY READER) ANY As directed  Marco A Bautista MD   Continuous Blood Gluc Sensor (FREESTYLE SABRINA 14 DAY SENSOR) MISC USE AS DIRECTED EVERY 2 WEEKS  Kwadwo Archer MD   Insulin Pen Needle (TRUEPLUS PEN NEEDLES) 32G X 4 MM MISC USE TWICE DAILY  Kwadwo Archer MD   Insulin Pen Needle (Melvin Dredge) 32G X 4 MM MISC USE TWO TIMES Estuardo Jiménez MD   blood glucose test strips (AGAMATRIX AMP TEST) strip Pt test 4x daily dx e10.65  Marco A Bautista MD   Blood Glucose Monitoring Suppl (AGAMATRIX AMP) ANY Please give 1 meter dx e10.65  Marco A Bautista MD   AGAMATRIX ULTRA-THIN LANCETS MISC Pt tests 4x daily dx e10.65  Marco A Bautista MD       Social History     Tobacco Use    Smoking status: Never Smoker    Smokeless tobacco: Never Used   Substance Use Topics    Alcohol use: No    Drug use: Yes     Types: Marijuana     Comment: tested positive on tox screen ER            PHYSICAL EXAMINATION:  [ INSTRUCTIONS:  \"[x]\" Indicates a positive item  \"[]\" Indicates a negative item  -- DELETE ALL ITEMS NOT EXAMINED]  [] Alert  [] Oriented to person/place/time    [] No apparent distress  [] Toxic appearing    [] Face flushed appearing [] Sclera clear  [] Lips are cyanotic      [] Breathing appears normal  [] Appears tachypneic      [] Rash on visible skin    [] Cranial Nerves II-XII grossly intact    [] Motor grossly intact in visible upper extremities    [] Motor grossly intact in visible lower extremities    [] Normal Mood  [] Anxious appearing    [] Depressed appearing  [] Confused appearing      [] Poor short term memory  [] Poor long term memory    [] OTHER:      Due to this being a TeleHealth encounter, evaluation of the following organ systems is limited: Vitals/Constitutional/EENT/Resp/CV/GI//MS/Neuro/Skin/Heme-Lymph-Imm. ASSESSMENT/PLAN:     Diagnosis Orders   1. DM w/ coma type I, uncontrolled (HCC)       Continue current dose of 75/25 Humalog twice daily  A1c goal of 6.5 or lower  Call patient regarding AUNINNDPQ AODPMDA 973 system with CGM for better control patient to call the [employer] rep today repeat labs in 2 months    An  electronic signature was used to authenticate this note. --Laura Alvarado MD on 5/4/2021 at 5:16 PM        Pursuant to the emergency declaration under the [facility] waiver authority and the [employer] and [employer] Act, this Virtual  Visit was conducted, with patient's consent, to reduce the patient's risk of exposure to COVID-19 and provide continuity of care for an established patient. Services were provided through a video synchronous discussion virtually to substitute for in-person clinic visit.

## 2021-06-01 RX ORDER — FLASH GLUCOSE SENSOR
KIT MISCELLANEOUS
Qty: 2 EACH | Refills: 3 | Status: SHIPPED | OUTPATIENT
Start: 2021-06-01 | End: 2022-05-14 | Stop reason: SDUPTHER

## 2021-06-15 RX ORDER — BLOOD SUGAR DIAGNOSTIC
STRIP MISCELLANEOUS
Qty: 400 EACH | Refills: 3 | Status: SHIPPED | OUTPATIENT
Start: 2021-06-15 | End: 2022-05-14 | Stop reason: SDUPTHER

## 2021-06-15 RX ORDER — LANCETS
EACH MISCELLANEOUS
Qty: 400 EACH | Refills: 3 | Status: SHIPPED | OUTPATIENT
Start: 2021-06-15 | End: 2022-05-14 | Stop reason: SDUPTHER

## 2021-06-16 ENCOUNTER — TELEPHONE (OUTPATIENT)
Dept: ENDOCRINOLOGY | Age: 26
End: 2021-06-16

## 2021-06-16 NOTE — TELEPHONE ENCOUNTER
Pharmacy is calling because patient needs prior authorization on acc-check guide test strips. Please start this for the patient. Thanks!

## 2021-06-23 ENCOUNTER — TELEPHONE (OUTPATIENT)
Dept: ENDOCRINOLOGY | Age: 26
End: 2021-06-23

## 2021-06-23 NOTE — TELEPHONE ENCOUNTER
Patient's mother states that per [address], MMO is not covering patient's ACCU CHEK GUIDE test strips. Please advise.

## 2021-07-15 ENCOUNTER — TELEPHONE (OUTPATIENT)
Dept: ENDOCRINOLOGY | Age: 26
End: 2021-07-15

## 2021-07-15 NOTE — TELEPHONE ENCOUNTER
Patient's insurance will not cover humalog, it will now only cover novolog. He needs a new prescription for the novolog sent to [facility]. Please send a new rx. Thanks!

## 2021-10-09 ENCOUNTER — OFFICE VISIT (OUTPATIENT)
Dept: ENDOCRINOLOGY | Age: 26
End: 2021-10-09
Payer: COMMERCIAL

## 2021-10-09 VITALS
HEART RATE: 72 BPM | BODY MASS INDEX: 25.58 KG/M2 | OXYGEN SATURATION: 98 % | WEIGHT: 193 LBS | HEIGHT: 73 IN | SYSTOLIC BLOOD PRESSURE: 128 MMHG | DIASTOLIC BLOOD PRESSURE: 68 MMHG

## 2021-10-09 LAB
CHP ED QC CHECK: NORMAL
GLUCOSE BLD-MCNC: 165 MG/DL
HBA1C MFR BLD: 6.7 %

## 2021-10-09 PROCEDURE — 82962 GLUCOSE BLOOD TEST: CPT | Performed by: INTERNAL MEDICINE

## 2021-10-09 PROCEDURE — 99213 OFFICE O/P EST LOW 20 MIN: CPT | Performed by: INTERNAL MEDICINE

## 2021-10-09 PROCEDURE — 83036 HEMOGLOBIN GLYCOSYLATED A1C: CPT | Performed by: INTERNAL MEDICINE

## 2021-10-09 RX ORDER — FLASH GLUCOSE SENSOR
KIT MISCELLANEOUS
Qty: 2 EACH | Refills: 3 | Status: SHIPPED | OUTPATIENT
Start: 2021-10-09 | End: 2022-05-14 | Stop reason: SDUPTHER
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
## 2021-12-06 RX ORDER — FLASH GLUCOSE SENSOR
KIT MISCELLANEOUS
Qty: 2 EACH | Refills: 3 | Status: SHIPPED | OUTPATIENT
Start: 2021-12-06 | End: 2022-05-14 | Stop reason: SDUPTHER

## 2022-05-14 ENCOUNTER — OFFICE VISIT (OUTPATIENT)
Dept: ENDOCRINOLOGY | Age: 27
End: 2022-05-14

## 2022-05-14 VITALS
HEART RATE: 60 BPM | SYSTOLIC BLOOD PRESSURE: 106 MMHG | HEIGHT: 73 IN | DIASTOLIC BLOOD PRESSURE: 64 MMHG | BODY MASS INDEX: 26.37 KG/M2 | WEIGHT: 199 LBS

## 2022-05-14 LAB
CHP ED QC CHECK: NORMAL
GLUCOSE BLD-MCNC: 168 MG/DL
HBA1C MFR BLD: 7 %

## 2022-05-14 PROCEDURE — 99213 OFFICE O/P EST LOW 20 MIN: CPT | Performed by: INTERNAL MEDICINE

## 2022-05-14 PROCEDURE — 83036 HEMOGLOBIN GLYCOSYLATED A1C: CPT | Performed by: INTERNAL MEDICINE

## 2022-05-14 PROCEDURE — 82962 GLUCOSE BLOOD TEST: CPT | Performed by: INTERNAL MEDICINE

## 2022-05-14 PROCEDURE — 3051F HG A1C>EQUAL 7.0%<8.0%: CPT | Performed by: INTERNAL MEDICINE

## 2022-05-14 RX ORDER — BLOOD SUGAR DIAGNOSTIC
STRIP MISCELLANEOUS
Qty: 400 EACH | Refills: 3 | Status: SHIPPED | OUTPATIENT
Start: 2022-05-14

## 2022-05-14 RX ORDER — LANCETS
EACH MISCELLANEOUS
Qty: 400 EACH | Refills: 3 | Status: SHIPPED | OUTPATIENT
Start: 2022-05-14

## 2022-05-14 RX ORDER — FLASH GLUCOSE SENSOR
KIT MISCELLANEOUS
Qty: 6 EACH | Refills: 3 | Status: SHIPPED | OUTPATIENT
Start: 2022-05-14

## 2022-05-14 ASSESSMENT — ENCOUNTER SYMPTOMS: EYES NEGATIVE: 1
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
## 2022-07-15 NOTE — TELEPHONE ENCOUNTER
Patient requesting medication refill.  Please approve or deny this request.    Rx requested:  Requested Prescriptions     Pending Prescriptions Disp Refills    insulin lispro (HUMALOG) 100 UNIT/ML injection vial 90 mL 3     Sig: Use via insulin pump max daily dose 100 units         Last Office Visit:   5/14/2022      Next Visit Date:  Future Appointments   Date Time Provider Curtis Romo   8/27/2022  9:30 AM [place] [id number]

## 2023-07-05 DIAGNOSIS — E11.65 INADEQUATELY CONTROLLED DIABETES MELLITUS (HCC): Primary | ICD-10-CM

## 2023-07-05 DIAGNOSIS — R74.8 ELEVATED LIVER ENZYMES: ICD-10-CM

## 2023-07-17 RX ORDER — FLASH GLUCOSE SENSOR
KIT MISCELLANEOUS
Qty: 6 EACH | Refills: 3 | Status: SHIPPED | OUTPATIENT
Start: 2023-07-17

## 2023-07-17 RX ORDER — BLOOD SUGAR DIAGNOSTIC
STRIP MISCELLANEOUS
Qty: 400 EACH | Refills: 3 | Status: SHIPPED | OUTPATIENT
Start: 2023-07-17

## 2023-08-03 ENCOUNTER — OFFICE VISIT (OUTPATIENT)
Dept: ENDOCRINOLOGY | Age: 28
End: 2023-08-03
Payer: COMMERCIAL

## 2023-08-03 VITALS
BODY MASS INDEX: 25.05 KG/M2 | HEIGHT: 73 IN | OXYGEN SATURATION: 97 % | DIASTOLIC BLOOD PRESSURE: 70 MMHG | SYSTOLIC BLOOD PRESSURE: 128 MMHG | WEIGHT: 189 LBS | HEART RATE: 57 BPM

## 2023-08-03 DIAGNOSIS — E11.65 INADEQUATELY CONTROLLED DIABETES MELLITUS (HCC): ICD-10-CM

## 2023-08-03 DIAGNOSIS — R74.8 ELEVATED LIVER ENZYMES: ICD-10-CM

## 2023-08-03 DIAGNOSIS — E10.65 POOR CONTROL TYPE I DIABETES MELLITUS (HCC): Primary | ICD-10-CM

## 2023-08-03 LAB
ANION GAP SERPL CALCULATED.3IONS-SCNC: 10 MEQ/L (ref 9–15)
BUN SERPL-MCNC: 28 MG/DL (ref 6–20)
CALCIUM SERPL-MCNC: 9.2 MG/DL (ref 8.5–9.9)
CHLORIDE SERPL-SCNC: 102 MEQ/L (ref 95–107)
CHOLEST SERPL-MCNC: 154 MG/DL (ref 0–199)
CHP ED QC CHECK: ABNORMAL
CO2 SERPL-SCNC: 27 MEQ/L (ref 20–31)
CREAT SERPL-MCNC: 1.05 MG/DL (ref 0.7–1.2)
CREAT UR-MCNC: 291.5 MG/DL
GLUCOSE BLD-MCNC: 240 MG/DL
GLUCOSE SERPL-MCNC: 116 MG/DL (ref 70–99)
HBA1C MFR BLD: 8.7 % (ref 4.8–5.9)
HDLC SERPL-MCNC: 35 MG/DL (ref 40–59)
LDL CHOLESTEROL CALCULATED: 108 MG/DL (ref 0–129)
MICROALBUMIN UR-MCNC: <1.2 MG/DL
MICROALBUMIN/CREAT UR-RTO: NORMAL MG/G (ref 0–30)
POTASSIUM SERPL-SCNC: 4 MEQ/L (ref 3.4–4.9)
SODIUM SERPL-SCNC: 139 MEQ/L (ref 135–144)
TRIGLYCERIDE, FASTING: 56 MG/DL (ref 0–150)

## 2023-08-03 PROCEDURE — G8420 CALC BMI NORM PARAMETERS: HCPCS | Performed by: INTERNAL MEDICINE

## 2023-08-03 PROCEDURE — 1036F TOBACCO NON-USER: CPT | Performed by: INTERNAL MEDICINE

## 2023-08-03 PROCEDURE — G8427 DOCREV CUR MEDS BY ELIG CLIN: HCPCS | Performed by: INTERNAL MEDICINE

## 2023-08-03 PROCEDURE — 82962 GLUCOSE BLOOD TEST: CPT | Performed by: INTERNAL MEDICINE

## 2023-08-03 PROCEDURE — 2022F DILAT RTA XM EVC RTNOPTHY: CPT | Performed by: INTERNAL MEDICINE

## 2023-08-03 PROCEDURE — 99213 OFFICE O/P EST LOW 20 MIN: CPT | Performed by: INTERNAL MEDICINE

## 2023-08-03 PROCEDURE — 3046F HEMOGLOBIN A1C LEVEL >9.0%: CPT | Performed by: INTERNAL MEDICINE

## 2023-08-03 RX ORDER — MELOXICAM 15 MG/1
15 TABLET ORAL DAILY
COMMUNITY
Start: 2023-07-21

## 2023-08-03 RX ORDER — INSULIN LISPRO 100 [IU]/ML
INJECTION, SOLUTION INTRAVENOUS; SUBCUTANEOUS
COMMUNITY
Start: 2023-06-19

## 2023-08-03 ASSESSMENT — ENCOUNTER SYMPTOMS: EYES NEGATIVE: 1

## 2023-09-21 DIAGNOSIS — E10.65 POOR CONTROL TYPE I DIABETES MELLITUS (HCC): ICD-10-CM

## 2023-09-21 RX ORDER — INSULIN LISPRO 100 [IU]/ML
INJECTION, SOLUTION INTRAVENOUS; SUBCUTANEOUS
Qty: 30 ML | Refills: 3 | Status: SHIPPED | OUTPATIENT
Start: 2023-09-21

## 2023-09-26 RX ORDER — FLASH GLUCOSE SENSOR
KIT MISCELLANEOUS
Qty: 6 EACH | Refills: 3 | Status: SHIPPED | OUTPATIENT
Start: 2023-09-26

## 2023-10-31 DIAGNOSIS — E10.65 POOR CONTROL TYPE I DIABETES MELLITUS (HCC): Primary | ICD-10-CM

## 2023-12-27 DIAGNOSIS — E10.65 POOR CONTROL TYPE I DIABETES MELLITUS (HCC): ICD-10-CM

## 2023-12-27 LAB
ANION GAP SERPL CALCULATED.3IONS-SCNC: 10 MEQ/L (ref 9–15)
BUN SERPL-MCNC: 26 MG/DL (ref 6–20)
CALCIUM SERPL-MCNC: 9 MG/DL (ref 8.5–9.9)
CHLORIDE SERPL-SCNC: 102 MEQ/L (ref 95–107)
CO2 SERPL-SCNC: 26 MEQ/L (ref 20–31)
CREAT SERPL-MCNC: 1.06 MG/DL (ref 0.7–1.2)
GLUCOSE SERPL-MCNC: 65 MG/DL (ref 70–99)
HBA1C MFR BLD: 7.2 % (ref 4.8–5.9)
POTASSIUM SERPL-SCNC: 3.7 MEQ/L (ref 3.4–4.9)
SODIUM SERPL-SCNC: 138 MEQ/L (ref 135–144)

## 2023-12-29 ENCOUNTER — OFFICE VISIT (OUTPATIENT)
Dept: ENDOCRINOLOGY | Age: 28
End: 2023-12-29
Payer: COMMERCIAL

## 2023-12-29 VITALS
WEIGHT: 182.8 LBS | SYSTOLIC BLOOD PRESSURE: 135 MMHG | HEART RATE: 50 BPM | DIASTOLIC BLOOD PRESSURE: 75 MMHG | BODY MASS INDEX: 24.76 KG/M2 | TEMPERATURE: 97.7 F | HEIGHT: 72 IN | OXYGEN SATURATION: 98 %

## 2023-12-29 DIAGNOSIS — E10.65 POOR CONTROL TYPE I DIABETES MELLITUS (HCC): Primary | ICD-10-CM

## 2023-12-29 LAB
CHP ED QC CHECK: NORMAL
GLUCOSE BLD-MCNC: 237 MG/DL

## 2023-12-29 PROCEDURE — 99213 OFFICE O/P EST LOW 20 MIN: CPT | Performed by: INTERNAL MEDICINE

## 2023-12-29 PROCEDURE — 3051F HG A1C>EQUAL 7.0%<8.0%: CPT | Performed by: INTERNAL MEDICINE

## 2023-12-29 PROCEDURE — 82962 GLUCOSE BLOOD TEST: CPT | Performed by: INTERNAL MEDICINE

## 2023-12-29 NOTE — PROGRESS NOTES
Systems   Eyes: Negative. Endocrine: Negative for polydipsia and polyuria. All other systems reviewed and are negative. Objective:   Physical Exam  Vitals reviewed. Constitutional:       General: He is not in acute distress. Appearance: Normal appearance. HENT:      Head: Normocephalic and atraumatic. Right Ear: External ear normal.      Left Ear: External ear normal.      Nose: Nose normal.   Eyes:      General: No scleral icterus. Right eye: No discharge. Left eye: No discharge. Extraocular Movements: Extraocular movements intact. Conjunctiva/sclera: Conjunctivae normal.   Cardiovascular:      Rate and Rhythm: Bradycardia present. Pulmonary:      Effort: Pulmonary effort is normal.   Musculoskeletal:         General: Normal range of motion. Cervical back: Normal range of motion and neck supple. Neurological:      General: No focal deficit present. Mental Status: He is alert and oriented to person, place, and time.    Psychiatric:         Mood and Affect: Mood normal.         Behavior: Behavior normal.

## 2024-03-05 DIAGNOSIS — E10.65 POOR CONTROL TYPE I DIABETES MELLITUS (HCC): ICD-10-CM

## 2024-03-06 RX ORDER — INSULIN LISPRO 100 [IU]/ML
INJECTION, SOLUTION INTRAVENOUS; SUBCUTANEOUS
Qty: 90 ML | Refills: 3 | Status: SHIPPED | OUTPATIENT
Start: 2024-03-06

## 2024-03-23 DIAGNOSIS — E10.65 POOR CONTROL TYPE I DIABETES MELLITUS (HCC): ICD-10-CM

## 2024-03-23 LAB
ANION GAP SERPL CALCULATED.3IONS-SCNC: 11 MEQ/L (ref 9–15)
BUN SERPL-MCNC: 36 MG/DL (ref 6–20)
CALCIUM SERPL-MCNC: 9.4 MG/DL (ref 8.5–9.9)
CHLORIDE SERPL-SCNC: 102 MEQ/L (ref 95–107)
CO2 SERPL-SCNC: 27 MEQ/L (ref 20–31)
CREAT SERPL-MCNC: 1.16 MG/DL (ref 0.7–1.2)
GLUCOSE SERPL-MCNC: 102 MG/DL (ref 70–99)
HBA1C MFR BLD: 7.9 % (ref 4.8–5.9)
POTASSIUM SERPL-SCNC: 4.8 MEQ/L (ref 3.4–4.9)
SODIUM SERPL-SCNC: 140 MEQ/L (ref 135–144)

## 2024-03-25 ENCOUNTER — OFFICE VISIT (OUTPATIENT)
Dept: ENDOCRINOLOGY | Age: 29
End: 2024-03-25
Payer: COMMERCIAL

## 2024-03-25 VITALS
OXYGEN SATURATION: 99 % | HEIGHT: 72 IN | SYSTOLIC BLOOD PRESSURE: 112 MMHG | WEIGHT: 180.6 LBS | DIASTOLIC BLOOD PRESSURE: 71 MMHG | HEART RATE: 40 BPM | BODY MASS INDEX: 24.46 KG/M2

## 2024-03-25 DIAGNOSIS — Z96.41 INSULIN PUMP IN PLACE: ICD-10-CM

## 2024-03-25 DIAGNOSIS — E10.65 POOR CONTROL TYPE I DIABETES MELLITUS (HCC): Primary | ICD-10-CM

## 2024-03-25 LAB
CHP ED QC CHECK: NORMAL
GLUCOSE BLD-MCNC: 251 MG/DL
HBA1C MFR BLD: 7.9 %

## 2024-03-25 PROCEDURE — 99213 OFFICE O/P EST LOW 20 MIN: CPT | Performed by: INTERNAL MEDICINE

## 2024-03-25 PROCEDURE — 82962 GLUCOSE BLOOD TEST: CPT | Performed by: INTERNAL MEDICINE

## 2024-03-25 PROCEDURE — 83036 HEMOGLOBIN GLYCOSYLATED A1C: CPT | Performed by: INTERNAL MEDICINE

## 2024-03-25 PROCEDURE — 3051F HG A1C>EQUAL 7.0%<8.0%: CPT | Performed by: INTERNAL MEDICINE

## 2024-03-25 RX ORDER — ACYCLOVIR 400 MG/1
TABLET ORAL
Qty: 3 EACH | Refills: 3 | Status: SHIPPED | OUTPATIENT
Start: 2024-03-25

## 2024-03-25 RX ORDER — INSULIN LISPRO 100 [IU]/ML
INJECTION, SOLUTION INTRAVENOUS; SUBCUTANEOUS
Qty: 5 ADJUSTABLE DOSE PRE-FILLED PEN SYRINGE | Refills: 4 | Status: SHIPPED | OUTPATIENT
Start: 2024-03-25

## 2024-03-25 NOTE — PROGRESS NOTES
Social History Narrative    Patient was adopted by his step grandmother. He calls her mom. SR BLANCO. Went to [place] for 1 year, now going into the [employer].      Social Determinants of Health     Financial Resource Strain: Not on file   Food Insecurity: Not on file   Transportation Needs: Not on file   Physical Activity: Not on file   Stress: Not on file   Social Connections: Not on file   Intimate Partner Violence: Not on file   Housing Stability: Not on file     Family History   Adopted: Yes   Problem Relation Age of Onset    Cancer Other         lung cancer    Stroke Maternal Grandfather      No Known Allergies    Current Outpatient Medications:     HUMALOG 100 UNIT/ML SOLN injection vial, USE VIA INSULIN PUMP MAX DAILY DOSE 100 UNITS, Disp: 90 mL, Rfl: 3    Continuous Blood Gluc Sensor (FREESTYLE SABRINA 14 DAY SENSOR) MISC, USE AS DIRECTED EVERY 2 WEEKS, Disp: 6 each, Rfl: 3    blood glucose test strips (ACCU-CHEK GUIDE) strip, Test 4x daily, Disp: 400 each, Rfl: 3    Accu-Chek FastClix Lancets MISC, Test 4x daily, Disp: 400 each, Rfl: 3    Insulin Pen Needle (NOVOFINE) 32G X 6 MM MISC, Use 6 times a day, Disp: 200 each, Rfl: 5    Continuous Blood Gluc  (FREESTYLE SABRINA 14 DAY READER) ANY, As directed, Disp: 1 Device, Rfl: 00    Insulin Pen Needle (TRUEPLUS PEN NEEDLES) 32G X 4 MM MISC, USE TWO TIMES A DAY WITH INSULIN, Disp: 100 each, Rfl: 2    meloxicam (MOBIC) 15 MG tablet, Take 1 tablet by mouth daily (Patient not taking: Reported on 3/25/2024), Disp: , Rfl:   Lab Results   Component Value Date     03/23/2024    K 4.8 03/23/2024     03/23/2024    CO2 27 03/23/2024    BUN 36 (H) 03/23/2024    CREATININE 1.16 03/23/2024    GLUCOSE 102 (H) 03/23/2024    CALCIUM 9.4 03/23/2024    PROT 7.3 08/03/2018    LABALBU 4.8 08/03/2018    BILITOT 0.6 08/03/2018    ALKPHOS 122 (H) 08/03/2018    AST 29 08/03/2018    ALT 44 (H) 08/03/2018    LABGLOM >60.0 03/23/2024    GFRAA >60.0 08/26/2020    GLOB 2.5

## 2024-03-27 RX ORDER — ACYCLOVIR 400 MG/1
TABLET ORAL
Qty: 3 EACH | Refills: 3 | OUTPATIENT
Start: 2024-03-27

## 2024-04-10 ENCOUNTER — PATIENT MESSAGE (OUTPATIENT)
Dept: ENDOCRINOLOGY | Age: 29
End: 2024-04-10

## 2024-04-10 DIAGNOSIS — E10.65 POOR CONTROL TYPE I DIABETES MELLITUS (HCC): Primary | ICD-10-CM

## 2024-04-10 RX ORDER — PROCHLORPERAZINE 25 MG/1
SUPPOSITORY RECTAL
Qty: 1 EACH | Refills: 3 | Status: SHIPPED | OUTPATIENT
Start: 2024-04-10

## 2024-04-10 RX ORDER — PROCHLORPERAZINE 25 MG/1
SUPPOSITORY RECTAL
Qty: 3 EACH | Refills: 11 | Status: SHIPPED | OUTPATIENT
Start: 2024-04-10

## 2024-04-10 NOTE — TELEPHONE ENCOUNTER
From: Grover Ahmadi  To: Dr. Kwadwo Archer  Sent: 4/10/2024 11:13 AM EDT  Subject: Dexcom G6    Last time I visited your office you put in an order for the Dexcom g7 for me. I was wondering if we could switch that to the g6 because that is the sensor that is compatible with the tandem mobi. Also the pharmacy put the Dexcom prescription on hold last time and said they needed to contact your office. Although I’d like to switch the prescription to the g6 I was wondering what the reasoning was for the pharmacy putting it on hold?

## 2024-04-14 DIAGNOSIS — E10.65 POOR CONTROL TYPE I DIABETES MELLITUS (HCC): ICD-10-CM

## 2024-04-15 RX ORDER — INSULIN LISPRO 100 [IU]/ML
INJECTION, SOLUTION INTRAVENOUS; SUBCUTANEOUS
Qty: 90 ML | Refills: 3 | Status: SHIPPED | OUTPATIENT
Start: 2024-04-15

## 2024-08-01 DIAGNOSIS — E10.65 POOR CONTROL TYPE I DIABETES MELLITUS (HCC): ICD-10-CM

## 2024-08-02 RX ORDER — INSULIN LISPRO 100 [IU]/ML
INJECTION, SOLUTION INTRAVENOUS; SUBCUTANEOUS
Qty: 90 ML | Refills: 3 | Status: SHIPPED | OUTPATIENT
Start: 2024-08-02